# Patient Record
Sex: FEMALE | Race: WHITE | Employment: UNEMPLOYED | URBAN - METROPOLITAN AREA
[De-identification: names, ages, dates, MRNs, and addresses within clinical notes are randomized per-mention and may not be internally consistent; named-entity substitution may affect disease eponyms.]

---
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
## 2022-02-01 ENCOUNTER — NURSE TRIAGE (OUTPATIENT)
Dept: OTHER | Facility: CLINIC | Age: 22
End: 2022-02-01

## 2022-02-01 NOTE — TELEPHONE ENCOUNTER
Received call from Lucy at [facility], caller not on line.      Complaint: Pregnancy Concerns    Market: [employer] [employer]    Practice Name: [facility]    Caller's telephone number verified as [phone]    Unsuccessful attempt to re-connect with caller via phone, left message for return call to office

## 2022-02-02 ENCOUNTER — OFFICE VISIT (OUTPATIENT)
Dept: FAMILY MEDICINE CLINIC | Age: 22
End: 2022-02-02
Payer: COMMERCIAL

## 2022-02-02 VITALS
HEART RATE: 80 BPM | OXYGEN SATURATION: 99 % | HEIGHT: 63 IN | DIASTOLIC BLOOD PRESSURE: 68 MMHG | RESPIRATION RATE: 16 BRPM | WEIGHT: 139 LBS | SYSTOLIC BLOOD PRESSURE: 114 MMHG | TEMPERATURE: 97.2 F | BODY MASS INDEX: 24.63 KG/M2

## 2022-02-02 DIAGNOSIS — Z3A.01 LESS THAN 8 WEEKS GESTATION OF PREGNANCY: Primary | ICD-10-CM

## 2022-02-02 DIAGNOSIS — N92.6 MISSED MENSES: ICD-10-CM

## 2022-02-02 LAB
CONTROL: ABNORMAL
PREGNANCY TEST URINE, POC: POSITIVE

## 2022-02-02 PROCEDURE — G8420 CALC BMI NORM PARAMETERS: HCPCS | Performed by: NURSE PRACTITIONER

## 2022-02-02 PROCEDURE — 1036F TOBACCO NON-USER: CPT | Performed by: NURSE PRACTITIONER

## 2022-02-02 PROCEDURE — 81025 URINE PREGNANCY TEST: CPT | Performed by: NURSE PRACTITIONER

## 2022-02-02 PROCEDURE — 99213 OFFICE O/P EST LOW 20 MIN: CPT | Performed by: NURSE PRACTITIONER

## 2022-02-02 PROCEDURE — G8427 DOCREV CUR MEDS BY ELIG CLIN: HCPCS | Performed by: NURSE PRACTITIONER

## 2022-02-02 PROCEDURE — G8484 FLU IMMUNIZE NO ADMIN: HCPCS | Performed by: NURSE PRACTITIONER

## 2022-02-02 RX ORDER — PNV NO.95/FERROUS FUM/FOLIC AC 28MG-0.8MG
1 TABLET ORAL DAILY
Qty: 30 TABLET | Refills: 5 | Status: SHIPPED | OUTPATIENT
Start: 2022-02-02

## 2022-02-02 ASSESSMENT — ENCOUNTER SYMPTOMS
CONSTIPATION: 0
SHORTNESS OF BREATH: 0
WHEEZING: 0
VOMITING: 0
DIARRHEA: 1
NAUSEA: 0
COUGH: 0

## 2022-02-02 NOTE — PROGRESS NOTES
Liberty Thomas (:  2000) is a 23 y.o. female,Established patient, here for evaluation of the following chief complaint(s):  Amenorrhea (Missed period in January. Two at home positive pregnancy tests. Having a hard time finding an OB/GYN that is accepting new patients. ) and Discuss Medications (Stopped taking zoloft about 1 month ago. Was having bad panic attacks which resolved after d/c zoloft. )         ASSESSMENT/PLAN:  1. Less than 8 weeks gestation of pregnancy  -     Prenatal Vit-Fe Fumarate-FA (PRENATAL VITAMINS) 28-0.8 MG TABS; Take 1 tablet by mouth daily, Disp-30 tablet, R-5Normal  Will self refer to OB/GYN. Advised to call [address] [place] [address] to get a list of female providers that deliver there (patient wants to deliver there and requests female)    2. Missed menses  -     POCT urine pregnancy      Return if symptoms worsen or fail to improve. Subjective   SUBJECTIVE/OBJECTIVE:  Patient has had 2 positive pregnancy tests at home. LMP 21. EDC 10/3/22 G/1, P/0  Complains of anxiety which is stable now per patient. She stopped zoloft approx one month ago. She stopped due to frequent panic attacks      Review of Systems   Constitutional: Positive for appetite change (increased). Negative for activity change, fatigue and unexpected weight change. Respiratory: Negative for cough, shortness of breath and wheezing. Cardiovascular: Negative for chest pain and palpitations. Gastrointestinal: Positive for diarrhea. Negative for constipation, nausea and vomiting. Heartburn/reflux   Neurological: Negative for weakness, light-headedness and headaches. Objective   /68   Pulse 80   Temp 97.2 °F (36.2 °C) (Temporal)   Resp 16   Ht 5' 3\" (1.6 m)   Wt 139 lb (63 kg)   LMP 2022   SpO2 99%   BMI 24.62 kg/m²    Physical Exam  Constitutional:       General: She is not in acute distress. Appearance: Normal appearance. She is well-developed.    HENT:      Head: Normocephalic and atraumatic. Neck:      Thyroid: No thyromegaly. Trachea: No tracheal deviation. Cardiovascular:      Rate and Rhythm: Normal rate and regular rhythm. Heart sounds: No murmur heard. Pulmonary:      Effort: Pulmonary effort is normal.      Breath sounds: Normal breath sounds. No wheezing or rales. Chest:      Chest wall: No tenderness. Abdominal:      General: Bowel sounds are normal.      Palpations: Abdomen is soft. Tenderness: There is no abdominal tenderness. Lymphadenopathy:      Cervical: No cervical adenopathy. Skin:     General: Skin is warm and dry. Neurological:      Mental Status: She is alert and oriented to person, place, and time. Psychiatric:         Mood and Affect: Mood normal.         Behavior: Behavior normal.          [facility]      An electronic signature was used to authenticate this note.     --JL Azul - CNP

## 2022-02-05 ENCOUNTER — APPOINTMENT (OUTPATIENT)
Dept: ULTRASOUND IMAGING | Age: 22
End: 2022-02-05
Payer: COMMERCIAL

## 2022-02-05 ENCOUNTER — HOSPITAL ENCOUNTER (EMERGENCY)
Age: 22
Discharge: HOME | End: 2022-02-05
Attending: STUDENT IN AN ORGANIZED HEALTH CARE EDUCATION/TRAINING PROGRAM
Payer: COMMERCIAL

## 2022-02-05 VITALS
HEIGHT: 63 IN | OXYGEN SATURATION: 99 % | SYSTOLIC BLOOD PRESSURE: 100 MMHG | HEART RATE: 70 BPM | RESPIRATION RATE: 18 BRPM | TEMPERATURE: 97.6 F | WEIGHT: 130 LBS | DIASTOLIC BLOOD PRESSURE: 61 MMHG | BODY MASS INDEX: 23.04 KG/M2

## 2022-02-05 DIAGNOSIS — O20.0 THREATENED MISCARRIAGE: ICD-10-CM

## 2022-02-05 DIAGNOSIS — O46.90 VAGINAL BLEEDING IN PREGNANCY: Primary | ICD-10-CM

## 2022-02-05 LAB
ABO/RH: NORMAL
ALBUMIN SERPL-MCNC: 4.3 G/DL (ref 3.5–5.2)
ALP BLD-CCNC: 47 U/L (ref 35–104)
ALT SERPL-CCNC: 15 U/L (ref 0–32)
ANION GAP SERPL CALCULATED.3IONS-SCNC: 12 MMOL/L (ref 7–16)
AST SERPL-CCNC: 18 U/L (ref 0–31)
BACTERIA: NORMAL /HPF
BASOPHILS ABSOLUTE: 0.04 E9/L (ref 0–0.2)
BASOPHILS RELATIVE PERCENT: 0.4 % (ref 0–2)
BILIRUB SERPL-MCNC: 0.6 MG/DL (ref 0–1.2)
BILIRUBIN URINE: NEGATIVE
BLOOD, URINE: NEGATIVE
BUN BLDV-MCNC: 8 MG/DL (ref 6–20)
CALCIUM SERPL-MCNC: 9 MG/DL (ref 8.6–10.2)
CHLORIDE BLD-SCNC: 102 MMOL/L (ref 98–107)
CLARITY: CLEAR
CLUE CELLS: NORMAL
CO2: 21 MMOL/L (ref 22–29)
COLOR: NORMAL
CREAT SERPL-MCNC: 0.4 MG/DL (ref 0.5–1)
EOSINOPHILS ABSOLUTE: 0.08 E9/L (ref 0.05–0.5)
EOSINOPHILS RELATIVE PERCENT: 0.8 % (ref 0–6)
EPITHELIAL CELLS, UA: NORMAL /HPF
GFR AFRICAN AMERICAN: >60
GFR NON-AFRICAN AMERICAN: >60 ML/MIN/1.73
GLUCOSE BLD-MCNC: 89 MG/DL (ref 74–99)
GLUCOSE URINE: NEGATIVE MG/DL
GONADOTROPIN, CHORIONIC (HCG) QUANT: ABNORMAL MIU/ML
HCT VFR BLD CALC: 39.4 % (ref 34–48)
HEMOGLOBIN: 13.3 G/DL (ref 11.5–15.5)
IMMATURE GRANULOCYTES #: 0.06 E9/L
IMMATURE GRANULOCYTES %: 0.6 % (ref 0–5)
KETONES, URINE: NEGATIVE MG/DL
LEUKOCYTE ESTERASE, URINE: NEGATIVE
LYMPHOCYTES ABSOLUTE: 1.68 E9/L (ref 1.5–4)
LYMPHOCYTES RELATIVE PERCENT: 16.9 % (ref 20–42)
MAGNESIUM: 2 MG/DL (ref 1.6–2.6)
MCH RBC QN AUTO: 32.4 PG (ref 26–35)
MCHC RBC AUTO-ENTMCNC: 33.8 % (ref 32–34.5)
MCV RBC AUTO: 96.1 FL (ref 80–99.9)
MONOCYTES ABSOLUTE: 0.52 E9/L (ref 0.1–0.95)
MONOCYTES RELATIVE PERCENT: 5.2 % (ref 2–12)
NEUTROPHILS ABSOLUTE: 7.56 E9/L (ref 1.8–7.3)
NEUTROPHILS RELATIVE PERCENT: 76.1 % (ref 43–80)
NITRITE, URINE: NEGATIVE
PDW BLD-RTO: 12.1 FL (ref 11.5–15)
PH UA: 7.5 (ref 5–9)
PLATELET # BLD: 249 E9/L (ref 130–450)
PMV BLD AUTO: 9.8 FL (ref 7–12)
POTASSIUM REFLEX MAGNESIUM: 3.5 MMOL/L (ref 3.5–5)
PROTEIN UA: NEGATIVE MG/DL
RBC # BLD: 4.1 E12/L (ref 3.5–5.5)
RBC UA: NORMAL /HPF (ref 0–2)
SODIUM BLD-SCNC: 135 MMOL/L (ref 132–146)
SOURCE WET PREP: NORMAL
SPECIFIC GRAVITY UA: 1.01 (ref 1–1.03)
TOTAL PROTEIN: 6.7 G/DL (ref 6.4–8.3)
TRICHOMONAS PREP: NORMAL
UROBILINOGEN, URINE: 0.2 E.U./DL
WBC # BLD: 9.9 E9/L (ref 4.5–11.5)
WBC UA: NORMAL /HPF (ref 0–5)
YEAST WET PREP: NORMAL

## 2022-02-05 PROCEDURE — 76801 OB US < 14 WKS SINGLE FETUS: CPT

## 2022-02-05 PROCEDURE — 84702 CHORIONIC GONADOTROPIN TEST: CPT

## 2022-02-05 PROCEDURE — 87591 N.GONORRHOEAE DNA AMP PROB: CPT

## 2022-02-05 PROCEDURE — 83735 ASSAY OF MAGNESIUM: CPT

## 2022-02-05 PROCEDURE — 86901 BLOOD TYPING SEROLOGIC RH(D): CPT

## 2022-02-05 PROCEDURE — 99283 EMERGENCY DEPT VISIT LOW MDM: CPT

## 2022-02-05 PROCEDURE — 87491 CHLMYD TRACH DNA AMP PROBE: CPT

## 2022-02-05 PROCEDURE — 86900 BLOOD TYPING SEROLOGIC ABO: CPT

## 2022-02-05 PROCEDURE — 85025 COMPLETE CBC W/AUTO DIFF WBC: CPT

## 2022-02-05 PROCEDURE — 80053 COMPREHEN METABOLIC PANEL: CPT

## 2022-02-05 PROCEDURE — 87210 SMEAR WET MOUNT SALINE/INK: CPT

## 2022-02-05 PROCEDURE — 81001 URINALYSIS AUTO W/SCOPE: CPT

## 2022-02-05 ASSESSMENT — PAIN SCALES - GENERAL: PAINLEVEL_OUTOF10: 3

## 2022-02-05 ASSESSMENT — PAIN DESCRIPTION - ORIENTATION: ORIENTATION: LOWER;MID

## 2022-02-05 ASSESSMENT — PAIN DESCRIPTION - LOCATION: LOCATION: ABDOMEN

## 2022-02-05 ASSESSMENT — PAIN DESCRIPTION - DESCRIPTORS: DESCRIPTORS: CRAMPING

## 2022-02-05 NOTE — ED PROVIDER NOTES
[address] [facility]  Department of Emergency Medicine     Written by: Sarabjit Borrero DO  Patient Name: Kiesha Salvador  Attending Provider: Rusty Torres MD  Admit Date: 2022  9:15 AM  MRN: [id number]                   : 2000        Chief Complaint   Patient presents with    Vaginal Bleeding     spotting low abd pain at 3/10 onset 1 hour    - Chief complaint    Ms. Juve Rose is a 22 yo female without significant PMHx who presents to the ED due to vaginal bleeding. Patient is 5 weeks pregnant and has not yet seen an OB/GYN for a formal ultrasound but notes that she began having vaginal bleeding today. She does not currently have an OB/GYN that she is following with. She states it is a spotting with some mild lower abdominal cramping. She has not had this to this point in her pregnancy. She denies any vaginal discharge at this time. She denies any urinary symptoms such as dysuria, frequency or hematuria. This is her first pregnancy. As her symptoms have been constant since onset. Denies any aggravating or relieving factors. States she has not had any recent fevers, chills, nausea, vomiting, chest pain, shortness of breath, abdominal pain, bowel or urinary changes, headaches or vision changes. Review of Systems   Constitutional: Negative for chills, fatigue and fever. HENT: Negative for congestion, sinus pressure and sinus pain. Eyes: Negative for pain and redness. Respiratory: Negative for cough, chest tightness and shortness of breath. Cardiovascular: Negative for chest pain, palpitations and leg swelling. Gastrointestinal: Positive for abdominal pain (Lower). Negative for constipation, diarrhea, nausea and vomiting. Genitourinary: Positive for vaginal bleeding. Negative for decreased urine volume, dysuria, flank pain, frequency, hematuria, urgency, vaginal discharge and vaginal pain. Musculoskeletal: Negative for joint swelling and myalgias.    Skin: Negative for rash.   Neurological: Negative for dizziness, light-headedness and headaches. Physical Exam  Exam conducted with a chaperone present. Constitutional:       General: She is not in acute distress. Appearance: Normal appearance. She is normal weight. HENT:      Head: Normocephalic and atraumatic. Right Ear: External ear normal.      Left Ear: External ear normal.      Mouth/Throat:      Mouth: Mucous membranes are moist.      Pharynx: Oropharynx is clear. Eyes:      Extraocular Movements: Extraocular movements intact. Conjunctiva/sclera: Conjunctivae normal.   Cardiovascular:      Rate and Rhythm: Normal rate and regular rhythm. Pulses: Normal pulses. Heart sounds: Normal heart sounds. Pulmonary:      Effort: Pulmonary effort is normal. No respiratory distress. Breath sounds: Normal breath sounds. No wheezing. Abdominal:      General: Abdomen is flat. Bowel sounds are normal.      Palpations: Abdomen is soft. Tenderness: There is abdominal tenderness (Lower abdominal - worst suprapubic). There is no guarding or rebound. Genitourinary:     Vagina: Normal. No vaginal discharge or bleeding. Cervix: Normal.   Musculoskeletal:         General: No tenderness. Normal range of motion. Cervical back: Normal range of motion and neck supple. No rigidity or tenderness. Skin:     General: Skin is warm and dry. Neurological:      General: No focal deficit present. Mental Status: She is alert and oriented to person, place, and time. Mental status is at baseline. Sensory: No sensory deficit. Motor: No weakness. Psychiatric:         Mood and Affect: Mood normal.         Behavior: Behavior normal.          Procedures       MDM  Number of Diagnoses or Management Options  Threatened miscarriage  Vaginal bleeding in pregnancy  Diagnosis management comments: This is a 24 yo female without significant PMHx who presents to the ED due to vaginal bleeding. Patient CBC was benign within normal limits. Her CMP was also within normal limits. Magnesium was normal.  Quantitative hCG was 12,860. Urinalysis was negative for any sort of urinary tract infection. ABO Rh was A+. Wet prep was negative. Chlamydia gonorrhea still pending. OB ultrasound revealed gestational sac corresponding to approximately 5.2 weeks gestational age without a fetal pole detected there is no evidence of free fluid or adnexal masses to suggest ectopic pregnancy. She has been given referral to an OB/GYN to establish care and follow-up with a repeat ultrasound in 1 to 2 weeks to confirm pregnancy. Patient has been told that spotting in early pregnancy is common and to monitor her symptoms if it worsens. Patient been told return the emergency department if her symptoms return, worsen or change at any time. --------------------------------------------- PAST HISTORY ---------------------------------------------  Past Medical History:  has a past medical history of Anxiety and depression. Past Surgical History:  has a past surgical history that includes Tonsillectomy; Adenoidectomy; and Tympanostomy tube placement. Social History:  reports that she quit smoking 8 days ago. She has never used smokeless tobacco. She reports that she does not drink alcohol and does not use drugs. Family History: Family history is unknown by patient. The patients home medications have been reviewed.     Allergies: Cefzil [cefprozil]    -------------------------------------------------- RESULTS -------------------------------------------------  Labs:  Results for orders placed or performed during the hospital encounter of 02/05/22   Wet prep, genital    Specimen: Vaginal   Result Value Ref Range    Trichomonas Prep None Seen     Yeast, Wet Prep None Seen     Clue Cells, Wet Prep None Seen     Source Wet Prep VAGINAL    C.trachomatis N.gonorrhoeae DNA, Urine    Specimen: Urine   Result Value Ref Range    Source Urine    Urinalysis with Microscopic   Result Value Ref Range    Color, UA Straw Straw/Yellow    Clarity, UA Clear Clear    Glucose, Ur Negative Negative mg/dL    Bilirubin Urine Negative Negative    Ketones, Urine Negative Negative mg/dL    Specific Gravity, UA 1.010 1.005 - 1.030    Blood, Urine Negative Negative    pH, UA 7.5 5.0 - 9.0    Protein, UA Negative Negative mg/dL    Urobilinogen, Urine 0.2 <2.0 E.U./dL    Nitrite, Urine Negative Negative    Leukocyte Esterase, Urine Negative Negative    WBC, UA 0-1 0 - 5 /HPF    RBC, UA NONE 0 - 2 /HPF    Epithelial Cells, UA RARE /HPF    Bacteria, UA NONE SEEN None Seen /HPF   CBC auto differential   Result Value Ref Range    WBC 9.9 4.5 - 11.5 E9/L    RBC 4.10 3.50 - 5.50 E12/L    Hemoglobin 13.3 11.5 - 15.5 g/dL    Hematocrit 39.4 34.0 - 48.0 %    MCV 96.1 80.0 - 99.9 fL    MCH 32.4 26.0 - 35.0 pg    MCHC 33.8 32.0 - 34.5 %    RDW 12.1 11.5 - 15.0 fL    Platelets 658 811 - 150 E9/L    MPV 9.8 7.0 - 12.0 fL    Neutrophils % 76.1 43.0 - 80.0 %    Immature Granulocytes % 0.6 0.0 - 5.0 %    Lymphocytes % 16.9 (L) 20.0 - 42.0 %    Monocytes % 5.2 2.0 - 12.0 %    Eosinophils % 0.8 0.0 - 6.0 %    Basophils % 0.4 0.0 - 2.0 %    Neutrophils Absolute 7.56 (H) 1.80 - 7.30 E9/L    Immature Granulocytes # 0.06 E9/L    Lymphocytes Absolute 1.68 1.50 - 4.00 E9/L    Monocytes Absolute 0.52 0.10 - 0.95 E9/L    Eosinophils Absolute 0.08 0.05 - 0.50 E9/L    Basophils Absolute 0.04 0.00 - 0.20 E9/L   Comprehensive Metabolic Panel w/ Reflex to MG   Result Value Ref Range    Sodium 135 132 - 146 mmol/L    Potassium reflex Magnesium 3.5 3.5 - 5.0 mmol/L    Chloride 102 98 - 107 mmol/L    CO2 21 (L) 22 - 29 mmol/L    Anion Gap 12 7 - 16 mmol/L    Glucose 89 74 - 99 mg/dL    BUN 8 6 - 20 mg/dL    CREATININE 0.4 (L) 0.5 - 1.0 mg/dL    GFR Non-African American >60 >=60 mL/min/1.73    GFR African American >60     Calcium 9.0 8.6 - 10.2 mg/dL    Total Protein 6.7 6.4 - 8.3 g/dL    Albumin 4.3 3.5 - 5.2 g/dL    Total Bilirubin 0.6 0.0 - 1.2 mg/dL    Alkaline Phosphatase 47 35 - 104 U/L    ALT 15 0 - 32 U/L    AST 18 0 - 31 U/L   HCG, Quantitative, Pregnancy   Result Value Ref Range    hCG Quant 59058.0 (H) <10 mIU/mL   Magnesium   Result Value Ref Range    Magnesium 2.0 1.6 - 2.6 mg/dL   ABO/RH   Result Value Ref Range    ABO/Rh A POS        Radiology:  US OB LESS THAN 14 WEEKS SINGLE OR FIRST GESTATION   Final Result   Gestational sac corresponding to approximately 5.2 weeks gestational age. No   fetal pole detected. No evidence of free fluid or adnexal mass to suggest   ectopic pregnancy. Consider evaluation of serial beta-hCGs and/or follow-up pelvic ultrasound in   1-2 weeks to confirm pregnancy.             ------------------------- NURSING NOTES AND VITALS REVIEWED ---------------------------  Date / Time Roomed:  2/5/2022  9:15 AM  ED Bed Assignment:  16/16    The nursing notes within the ED encounter and vital signs as below have been reviewed. /61   Pulse 70   Temp 97.6 °F (36.4 °C) (Infrared)   Resp 18   Ht 5' 3\" (1.6 m)   Wt 130 lb (59 kg)   LMP 12/27/2021   SpO2 99%   BMI 23.03 kg/m²   Oxygen Saturation Interpretation: Normal      ------------------------------------------ PROGRESS NOTES ------------------------------------------  10:10 AM EST  I have spoken with the patient and discussed todays results, in addition to providing specific details for the plan of care and counseling regarding the diagnosis and prognosis. Their questions are answered at this time and they are agreeable with the plan. I discussed at length with them reasons for immediate return here for re evaluation. They will followup with their OB/GYN and primary care physician by calling their office tomorrow.       --------------------------------- ADDITIONAL PROVIDER NOTES ---------------------------------  At this time the patient is without objective evidence of an acute process requiring hospitalization or inpatient management. They have remained hemodynamically stable throughout their entire ED visit and are stable for discharge with outpatient follow-up. The plan has been discussed in detail and they are aware of the specific conditions for emergent return, as well as the importance of follow-up. Discharge Medication List as of 2/5/2022 12:23 PM          Diagnosis:  1. Vaginal bleeding in pregnancy    2. Threatened miscarriage        Disposition:  Patient's disposition: Discharge to home  Patient's condition is stable. Patient was seen and evaluated by myself and my attending Ann Louis MD. Assessment and Plan discussed with attending provider, please see attestation for final plan of care.      DO Juana Garcia DO  Resident  02/06/22 [id number]

## 2022-02-05 NOTE — ED NOTES
Pt present to ED with vaginal bleeding that started today, pt states that she is 5 weeks pregnant and she sees blood when she wipes. Pt states she has some abdominal cramping as well.      Marylee Kansky, RN  02/05/22 [id number]

## 2022-02-06 ASSESSMENT — ENCOUNTER SYMPTOMS
DIARRHEA: 0
ABDOMINAL PAIN: 1
EYE PAIN: 0
SINUS PRESSURE: 0
VOMITING: 0
EYE REDNESS: 0
CHEST TIGHTNESS: 0
COUGH: 0
NAUSEA: 0
SHORTNESS OF BREATH: 0
CONSTIPATION: 0
SINUS PAIN: 0

## 2022-02-08 ASSESSMENT — PATIENT HEALTH QUESTIONNAIRE - PHQ9
6. FEELING BAD ABOUT YOURSELF - OR THAT YOU ARE A FAILURE OR HAVE LET YOURSELF OR YOUR FAMILY DOWN: 0
2. FEELING DOWN, DEPRESSED OR HOPELESS: 1
10. IF YOU CHECKED OFF ANY PROBLEMS, HOW DIFFICULT HAVE THESE PROBLEMS MADE IT FOR YOU TO DO YOUR WORK, TAKE CARE OF THINGS AT HOME, OR GET ALONG WITH OTHER PEOPLE: 0
SUM OF ALL RESPONSES TO PHQ QUESTIONS 1-9: 3
SUM OF ALL RESPONSES TO PHQ QUESTIONS 1-9: 3
7. TROUBLE CONCENTRATING ON THINGS, SUCH AS READING THE NEWSPAPER OR WATCHING TELEVISION: 0
SUM OF ALL RESPONSES TO PHQ QUESTIONS 1-9: 3
5. POOR APPETITE OR OVEREATING: 2
3. TROUBLE FALLING OR STAYING ASLEEP: 0
SUM OF ALL RESPONSES TO PHQ QUESTIONS 1-9: 3
SUM OF ALL RESPONSES TO PHQ9 QUESTIONS 1 & 2: 1
1. LITTLE INTEREST OR PLEASURE IN DOING THINGS: 0
8. MOVING OR SPEAKING SO SLOWLY THAT OTHER PEOPLE COULD HAVE NOTICED. OR THE OPPOSITE, BEING SO FIGETY OR RESTLESS THAT YOU HAVE BEEN MOVING AROUND A LOT MORE THAN USUAL: 0
9. THOUGHTS THAT YOU WOULD BE BETTER OFF DEAD, OR OF HURTING YOURSELF: 0
4. FEELING TIRED OR HAVING LITTLE ENERGY: 0

## 2022-02-09 LAB
C. TRACHOMATIS DNA ,URINE: NEGATIVE
N. GONORRHOEAE DNA, URINE: NEGATIVE
SOURCE: NORMAL

## 2022-02-14 ENCOUNTER — TELEPHONE (OUTPATIENT)
Dept: ADMINISTRATIVE | Age: 22
End: 2022-02-14

## 2022-02-14 NOTE — TELEPHONE ENCOUNTER
Pt called and requested to schedule as a new OB. Her LMP was 12/27 and first positive pregnancy test was 1/29. No availability. Please contact pt.

## 2022-05-04 ENCOUNTER — APPOINTMENT (OUTPATIENT)
Dept: ULTRASOUND IMAGING | Age: 22
End: 2022-05-04
Payer: COMMERCIAL

## 2022-05-04 ENCOUNTER — HOSPITAL ENCOUNTER (EMERGENCY)
Age: 22
Discharge: HOME | End: 2022-05-04
Attending: EMERGENCY MEDICINE
Payer: COMMERCIAL

## 2022-05-04 VITALS
SYSTOLIC BLOOD PRESSURE: 91 MMHG | TEMPERATURE: 97.5 F | HEIGHT: 63 IN | WEIGHT: 140 LBS | BODY MASS INDEX: 24.8 KG/M2 | OXYGEN SATURATION: 100 % | RESPIRATION RATE: 16 BRPM | DIASTOLIC BLOOD PRESSURE: 52 MMHG | HEART RATE: 59 BPM

## 2022-05-04 DIAGNOSIS — O26.892 PELVIC PAIN AFFECTING PREGNANCY IN SECOND TRIMESTER, ANTEPARTUM: Primary | ICD-10-CM

## 2022-05-04 DIAGNOSIS — R10.2 PELVIC PAIN AFFECTING PREGNANCY IN SECOND TRIMESTER, ANTEPARTUM: Primary | ICD-10-CM

## 2022-05-04 LAB
ALBUMIN SERPL-MCNC: 4 G/DL (ref 3.5–5.2)
ALP BLD-CCNC: 43 U/L (ref 35–104)
ALT SERPL-CCNC: 13 U/L (ref 0–32)
ANION GAP SERPL CALCULATED.3IONS-SCNC: 14 MMOL/L (ref 7–16)
AST SERPL-CCNC: 23 U/L (ref 0–31)
BACTERIA: ABNORMAL /HPF
BASOPHILS ABSOLUTE: 0.03 E9/L (ref 0–0.2)
BASOPHILS RELATIVE PERCENT: 0.3 % (ref 0–2)
BILIRUB SERPL-MCNC: 0.3 MG/DL (ref 0–1.2)
BILIRUBIN URINE: NEGATIVE
BLOOD, URINE: NEGATIVE
BUN BLDV-MCNC: 3 MG/DL (ref 6–20)
CALCIUM SERPL-MCNC: 9.2 MG/DL (ref 8.6–10.2)
CHLORIDE BLD-SCNC: 106 MMOL/L (ref 98–107)
CLARITY: CLEAR
CO2: 21 MMOL/L (ref 22–29)
COLOR: YELLOW
CREAT SERPL-MCNC: 0.3 MG/DL (ref 0.5–1)
EOSINOPHILS ABSOLUTE: 0.08 E9/L (ref 0.05–0.5)
EOSINOPHILS RELATIVE PERCENT: 0.7 % (ref 0–6)
EPITHELIAL CELLS, UA: ABNORMAL /HPF
GFR AFRICAN AMERICAN: >60
GFR NON-AFRICAN AMERICAN: >60 ML/MIN/1.73
GLUCOSE BLD-MCNC: 79 MG/DL (ref 74–99)
GLUCOSE URINE: NEGATIVE MG/DL
GONADOTROPIN, CHORIONIC (HCG) QUANT: ABNORMAL MIU/ML
HCT VFR BLD CALC: 34 % (ref 34–48)
HEMOGLOBIN: 11.7 G/DL (ref 11.5–15.5)
IMMATURE GRANULOCYTES #: 0.06 E9/L
IMMATURE GRANULOCYTES %: 0.6 % (ref 0–5)
KETONES, URINE: 15 MG/DL
LEUKOCYTE ESTERASE, URINE: ABNORMAL
LIPASE: 28 U/L (ref 13–60)
LYMPHOCYTES ABSOLUTE: 2.06 E9/L (ref 1.5–4)
LYMPHOCYTES RELATIVE PERCENT: 18.9 % (ref 20–42)
MCH RBC QN AUTO: 32.5 PG (ref 26–35)
MCHC RBC AUTO-ENTMCNC: 34.4 % (ref 32–34.5)
MCV RBC AUTO: 94.4 FL (ref 80–99.9)
MONOCYTES ABSOLUTE: 0.45 E9/L (ref 0.1–0.95)
MONOCYTES RELATIVE PERCENT: 4.1 % (ref 2–12)
NEUTROPHILS ABSOLUTE: 8.21 E9/L (ref 1.8–7.3)
NEUTROPHILS RELATIVE PERCENT: 75.4 % (ref 43–80)
NITRITE, URINE: NEGATIVE
PDW BLD-RTO: 13.2 FL (ref 11.5–15)
PH UA: 6.5 (ref 5–9)
PLATELET # BLD: 205 E9/L (ref 130–450)
PMV BLD AUTO: 10.4 FL (ref 7–12)
POTASSIUM SERPL-SCNC: 3.7 MMOL/L (ref 3.5–5)
PROTEIN UA: NEGATIVE MG/DL
RBC # BLD: 3.6 E12/L (ref 3.5–5.5)
RBC UA: ABNORMAL /HPF (ref 0–2)
SODIUM BLD-SCNC: 141 MMOL/L (ref 132–146)
SPECIFIC GRAVITY UA: 1.01 (ref 1–1.03)
TOTAL PROTEIN: 6.8 G/DL (ref 6.4–8.3)
UROBILINOGEN, URINE: 0.2 E.U./DL
WBC # BLD: 10.9 E9/L (ref 4.5–11.5)
WBC UA: ABNORMAL /HPF (ref 0–5)

## 2022-05-04 PROCEDURE — 80053 COMPREHEN METABOLIC PANEL: CPT

## 2022-05-04 PROCEDURE — 84702 CHORIONIC GONADOTROPIN TEST: CPT

## 2022-05-04 PROCEDURE — 85025 COMPLETE CBC W/AUTO DIFF WBC: CPT

## 2022-05-04 PROCEDURE — 2580000003 HC RX 258: Performed by: EMERGENCY MEDICINE

## 2022-05-04 PROCEDURE — 99284 EMERGENCY DEPT VISIT MOD MDM: CPT

## 2022-05-04 PROCEDURE — 76805 OB US >/= 14 WKS SNGL FETUS: CPT

## 2022-05-04 PROCEDURE — 6370000000 HC RX 637 (ALT 250 FOR IP): Performed by: EMERGENCY MEDICINE

## 2022-05-04 PROCEDURE — 81001 URINALYSIS AUTO W/SCOPE: CPT

## 2022-05-04 PROCEDURE — 83690 ASSAY OF LIPASE: CPT

## 2022-05-04 RX ORDER — ACETAMINOPHEN 325 MG/1
650 TABLET ORAL ONCE
Status: COMPLETED | OUTPATIENT
Start: 2022-05-04 | End: 2022-05-04

## 2022-05-04 RX ORDER — 0.9 % SODIUM CHLORIDE 0.9 %
1000 INTRAVENOUS SOLUTION INTRAVENOUS ONCE
Status: COMPLETED | OUTPATIENT
Start: 2022-05-04 | End: 2022-05-04

## 2022-05-04 RX ADMIN — SODIUM CHLORIDE 1000 ML: 9 INJECTION, SOLUTION INTRAVENOUS at 07:31

## 2022-05-04 RX ADMIN — ACETAMINOPHEN 650 MG: 325 TABLET ORAL at 07:45

## 2022-05-04 ASSESSMENT — PAIN SCALES - GENERAL
PAINLEVEL_OUTOF10: 2
PAINLEVEL_OUTOF10: 10
PAINLEVEL_OUTOF10: 5
PAINLEVEL_OUTOF10: 5

## 2022-05-04 ASSESSMENT — ENCOUNTER SYMPTOMS
NAUSEA: 0
EYE DISCHARGE: 0
SHORTNESS OF BREATH: 0
EYE PAIN: 0
BACK PAIN: 0
ABDOMINAL DISTENTION: 0
DIARRHEA: 0
SORE THROAT: 0
ABDOMINAL PAIN: 1
COUGH: 0
SINUS PRESSURE: 0
EYE REDNESS: 0
VOMITING: 0
WHEEZING: 0

## 2022-05-04 ASSESSMENT — PAIN DESCRIPTION - LOCATION: LOCATION: ABDOMEN

## 2022-05-04 ASSESSMENT — PAIN - FUNCTIONAL ASSESSMENT
PAIN_FUNCTIONAL_ASSESSMENT: 0-10
PAIN_FUNCTIONAL_ASSESSMENT: 0-10

## 2022-05-04 NOTE — ED PROVIDER NOTES
Patient is a 22 y/o female who presents to the ED with abdominal pain. Patient states that she had onset of lower abdominal pain tonight. The pain has been constant and is 10/10. She is currently approximately 18 weeks pregnant. She denies any fever, nausea, vomiting, diarrhea, dysuria, gross hematuria, vaginal discharge or vaginal bleeding. This is her first pregnancy. Review of Systems   Constitutional: Negative for chills and fever. HENT: Negative for ear pain, sinus pressure and sore throat. Eyes: Negative for pain, discharge and redness. Respiratory: Negative for cough, shortness of breath and wheezing. Cardiovascular: Negative for chest pain. Gastrointestinal: Positive for abdominal pain. Negative for abdominal distention, diarrhea, nausea and vomiting. Genitourinary: Negative for dysuria and frequency. Musculoskeletal: Negative for arthralgias and back pain. Skin: Negative for rash and wound. Neurological: Negative for weakness and headaches. Hematological: Negative for adenopathy. All other systems reviewed and are negative. Physical Exam  Vitals and nursing note reviewed. Constitutional:       General: She is not in acute distress. HENT:      Head: Normocephalic and atraumatic. Mouth/Throat:      Mouth: Mucous membranes are moist.   Eyes:      Pupils: Pupils are equal, round, and reactive to light. Cardiovascular:      Rate and Rhythm: Normal rate and regular rhythm. Heart sounds: No murmur heard. Pulmonary:      Effort: Pulmonary effort is normal. No respiratory distress. Breath sounds: Normal breath sounds. No stridor. No wheezing, rhonchi or rales. Abdominal:      General: Abdomen is flat. Bowel sounds are normal.      Palpations: Abdomen is soft. Tenderness: There is abdominal tenderness in the epigastric area and suprapubic area. There is no guarding. Negative signs include Reyes's sign and McBurney's sign.    Skin:     General: Skin is warm and dry. Findings: No rash. Neurological:      Mental Status: She is alert and oriented to person, place, and time. Procedures     [facility]     ED Course as of 05/04/22 0927   Wed May 04, 2022   0948 7:23 AM EDT  I received this patient at sign out from Dr. Duarte Le. I have discussed the patient's initial exam, treatment and plan of care with the out going physician. I have introduced my self to the patient / family and have answered their questions to this point. I have examined the patient myself and reviewed ordered tests / medications and  reviewed any available results to this point. If a resident is involved in the Emergency Department care, I have discussed my findings and plan with them as well. Patient states she is feeling improved but pain is not gone. Her blood pressure is improved. [JS]      ED Course User Index  [JS] Shy May DO        7:20 AM EDT  I have signed this patient out to the oncoming physician, Dr. Bridget Garcia. I have discussed the patient's initial exam, treatment and plan of care with the on coming physician. I have notified the patient / family of the change in treating physician and answered their questions to this point. ED Course as of 05/04/22 0928   Wed May 04, 2022   6850 7:23 AM EDT  I received this patient at sign out from Dr. Duarte Le. I have discussed the patient's initial exam, treatment and plan of care with the out going physician. I have introduced my self to the patient / family and have answered their questions to this point. I have examined the patient myself and reviewed ordered tests / medications and  reviewed any available results to this point. If a resident is involved in the Emergency Department care, I have discussed my findings and plan with them as well. Patient states she is feeling improved but pain is not gone. Her blood pressure is improved.    [JS]      ED Course User Index  [JS] Shy May DO --------------------------------------------- PAST HISTORY ---------------------------------------------  Past Medical History:  has a past medical history of Anxiety and depression. Past Surgical History:  has a past surgical history that includes Tonsillectomy; Adenoidectomy; and Tympanostomy tube placement. Social History:  reports that she quit smoking about 3 months ago. She has never used smokeless tobacco. She reports that she does not drink alcohol and does not use drugs. Family History: Family history is unknown by patient. The patients home medications have been reviewed.     Allergies: Cefzil [cefprozil]    -------------------------------------------------- RESULTS -------------------------------------------------  Labs:  Results for orders placed or performed during the hospital encounter of 05/04/22   Urinalysis   Result Value Ref Range    Color, UA Yellow Straw/Yellow    Clarity, UA Clear Clear    Glucose, Ur Negative Negative mg/dL    Bilirubin Urine Negative Negative    Ketones, Urine 15 (A) Negative mg/dL    Specific Gravity, UA 1.015 1.005 - 1.030    Blood, Urine Negative Negative    pH, UA 6.5 5.0 - 9.0    Protein, UA Negative Negative mg/dL    Urobilinogen, Urine 0.2 <2.0 E.U./dL    Nitrite, Urine Negative Negative    Leukocyte Esterase, Urine SMALL (A) Negative   CBC with Auto Differential   Result Value Ref Range    WBC 10.9 4.5 - 11.5 E9/L    RBC 3.60 3.50 - 5.50 E12/L    Hemoglobin 11.7 11.5 - 15.5 g/dL    Hematocrit 34.0 34.0 - 48.0 %    MCV 94.4 80.0 - 99.9 fL    MCH 32.5 26.0 - 35.0 pg    MCHC 34.4 32.0 - 34.5 %    RDW 13.2 11.5 - 15.0 fL    Platelets 686 418 - 216 E9/L    MPV 10.4 7.0 - 12.0 fL    Neutrophils % 75.4 43.0 - 80.0 %    Immature Granulocytes % 0.6 0.0 - 5.0 %    Lymphocytes % 18.9 (L) 20.0 - 42.0 %    Monocytes % 4.1 2.0 - 12.0 %    Eosinophils % 0.7 0.0 - 6.0 %    Basophils % 0.3 0.0 - 2.0 %    Neutrophils Absolute 8.21 (H) 1.80 - 7.30 E9/L    Immature Granulocytes # 0.06 E9/L    Lymphocytes Absolute 2.06 1.50 - 4.00 E9/L    Monocytes Absolute 0.45 0.10 - 0.95 E9/L    Eosinophils Absolute 0.08 0.05 - 0.50 E9/L    Basophils Absolute 0.03 0.00 - 0.20 E9/L   Comprehensive Metabolic Panel   Result Value Ref Range    Sodium 141 132 - 146 mmol/L    Potassium 3.7 3.5 - 5.0 mmol/L    Chloride 106 98 - 107 mmol/L    CO2 21 (L) 22 - 29 mmol/L    Anion Gap 14 7 - 16 mmol/L    Glucose 79 74 - 99 mg/dL    BUN 3 (L) 6 - 20 mg/dL    CREATININE 0.3 (L) 0.5 - 1.0 mg/dL    GFR Non-African American >60 >=60 mL/min/1.73    GFR African American >60     Calcium 9.2 8.6 - 10.2 mg/dL    Total Protein 6.8 6.4 - 8.3 g/dL    Albumin 4.0 3.5 - 5.2 g/dL    Total Bilirubin 0.3 0.0 - 1.2 mg/dL    Alkaline Phosphatase 43 35 - 104 U/L    ALT 13 0 - 32 U/L    AST 23 0 - 31 U/L   Lipase   Result Value Ref Range    Lipase 28 13 - 60 U/L   hCG, quantitative, pregnancy   Result Value Ref Range    hCG Quant 73124.0 (H) <10 mIU/mL   Microscopic Urinalysis   Result Value Ref Range    WBC, UA 10-20 (A) 0 - 5 /HPF    RBC, UA 1-3 0 - 2 /HPF    Epithelial Cells, UA MODERATE /HPF    Bacteria, UA FEW (A) None Seen /HPF       Radiology:  US OB 14 PLUS WEEKS SINGLE OR FIRST GESTATION   Final Result   A single live intrauterine pregnancy with estimated gestational age of 18   weeks 3 days +/-9 days by ultrasound. The estimated fetal weight is 235.6   +/- 35.3 g.             ------------------------- NURSING NOTES AND VITALS REVIEWED ---------------------------  Date / Time Roomed:  5/4/2022  5:15 AM  ED Bed Assignment:  08/08    The nursing notes within the ED encounter and vital signs as below have been reviewed.    BP (!) 91/52   Pulse 59   Temp 97.5 °F (36.4 °C) (Tympanic)   Resp 16   Ht 5' 3\" (1.6 m)   Wt 140 lb (63.5 kg)   LMP 01/27/2022   SpO2 100%   BMI 24.80 kg/m²   Oxygen Saturation Interpretation: Normal      ------------------------------------------ PROGRESS NOTES ------------------------------------------  I have spoken with the patient and discussed todays results, in addition to providing specific details for the plan of care and counseling regarding the diagnosis and prognosis. Their questions are answered at this time and they are agreeable with the plan. I discussed at length with them reasons for immediate return here for re evaluation. They will followup with primary care by calling their office tomorrow. --------------------------------- ADDITIONAL PROVIDER NOTES ---------------------------------  At this time the patient is without objective evidence of an acute process requiring hospitalization or inpatient management. They have remained hemodynamically stable throughout their entire ED visit and are stable for discharge with outpatient follow-up. The plan has been discussed in detail and they are aware of the specific conditions for emergent return, as well as the importance of follow-up. New Prescriptions    No medications on file       Diagnosis:  1. Pelvic pain affecting pregnancy in second trimester, antepartum        Disposition:  Patient's disposition: Discharge to home  Patient's condition is stable.          Nancy Hernandez DO  05/04/22 0288

## 2022-05-04 NOTE — ED NOTES
Water picture given to the pt and asked to notify me when she feels like she needs to urinate      Nabeel Falcon RN  05/04/22 0800

## 2022-07-09 ENCOUNTER — HOSPITAL ENCOUNTER (OUTPATIENT)
Age: 22
Discharge: HOME | End: 2022-07-09
Payer: COMMERCIAL

## 2022-07-09 LAB
GLUCOSE TOLERANCE SCREEN 50G: 100 MG/DL (ref 70–140)
HCT VFR BLD CALC: 32.8 % (ref 34–48)
HEMOGLOBIN: 11.3 G/DL (ref 11.5–15.5)
MCH RBC QN AUTO: 33.2 PG (ref 26–35)
MCHC RBC AUTO-ENTMCNC: 34.5 % (ref 32–34.5)
MCV RBC AUTO: 96.5 FL (ref 80–99.9)
PDW BLD-RTO: 13.3 FL (ref 11.5–15)
PLATELET # BLD: 177 E9/L (ref 130–450)
PMV BLD AUTO: 9.6 FL (ref 7–12)
RBC # BLD: 3.4 E12/L (ref 3.5–5.5)
WBC # BLD: 9.9 E9/L (ref 4.5–11.5)

## 2022-07-09 PROCEDURE — 85027 COMPLETE CBC AUTOMATED: CPT

## 2022-07-09 PROCEDURE — 36415 COLL VENOUS BLD VENIPUNCTURE: CPT

## 2022-07-09 PROCEDURE — 82950 GLUCOSE TEST: CPT

## 2022-07-14 ENCOUNTER — HOSPITAL ENCOUNTER (OUTPATIENT)
Age: 22
Discharge: HOME | End: 2022-07-14
Attending: OBSTETRICS & GYNECOLOGY | Admitting: OBSTETRICS & GYNECOLOGY
Payer: COMMERCIAL

## 2022-07-14 VITALS
RESPIRATION RATE: 16 BRPM | HEIGHT: 63 IN | OXYGEN SATURATION: 99 % | DIASTOLIC BLOOD PRESSURE: 55 MMHG | HEART RATE: 69 BPM | TEMPERATURE: 98.2 F | BODY MASS INDEX: 21.26 KG/M2 | SYSTOLIC BLOOD PRESSURE: 102 MMHG | WEIGHT: 120 LBS

## 2022-07-14 PROBLEM — Z3A.28 28 WEEKS GESTATION OF PREGNANCY: Status: ACTIVE | Noted: 2022-07-14

## 2022-07-14 PROBLEM — N93.0 PCB (POST COITAL BLEEDING): Status: ACTIVE | Noted: 2022-07-14

## 2022-07-14 LAB
BACTERIA: ABNORMAL /HPF
BILIRUBIN URINE: NEGATIVE
BLOOD, URINE: ABNORMAL
CLARITY: ABNORMAL
COLOR: YELLOW
EPITHELIAL CELLS, UA: ABNORMAL /HPF
GLUCOSE URINE: NEGATIVE MG/DL
KETONES, URINE: NEGATIVE MG/DL
LEUKOCYTE ESTERASE, URINE: NEGATIVE
NITRITE, URINE: NEGATIVE
PH UA: 8.5 (ref 5–9)
PROTEIN UA: NEGATIVE MG/DL
RBC UA: >20 /HPF (ref 0–2)
SPECIFIC GRAVITY UA: 1.01 (ref 1–1.03)
UROBILINOGEN, URINE: 0.2 E.U./DL
WBC UA: ABNORMAL /HPF (ref 0–5)

## 2022-07-14 PROCEDURE — 96361 HYDRATE IV INFUSION ADD-ON: CPT

## 2022-07-14 PROCEDURE — 96360 HYDRATION IV INFUSION INIT: CPT

## 2022-07-14 PROCEDURE — 81001 URINALYSIS AUTO W/SCOPE: CPT

## 2022-07-14 PROCEDURE — 2580000003 HC RX 258: Performed by: OBSTETRICS & GYNECOLOGY

## 2022-07-14 PROCEDURE — 99202 OFFICE O/P NEW SF 15 MIN: CPT

## 2022-07-14 RX ORDER — SODIUM CHLORIDE, SODIUM LACTATE, POTASSIUM CHLORIDE, AND CALCIUM CHLORIDE .6; .31; .03; .02 G/100ML; G/100ML; G/100ML; G/100ML
500 INJECTION, SOLUTION INTRAVENOUS ONCE
Status: COMPLETED | OUTPATIENT
Start: 2022-07-14 | End: 2022-07-14

## 2022-07-14 RX ORDER — SODIUM CHLORIDE, SODIUM LACTATE, POTASSIUM CHLORIDE, CALCIUM CHLORIDE 600; 310; 30; 20 MG/100ML; MG/100ML; MG/100ML; MG/100ML
INJECTION, SOLUTION INTRAVENOUS CONTINUOUS
Status: DISCONTINUED | OUTPATIENT
Start: 2022-07-14 | End: 2022-07-14 | Stop reason: HOSPADM

## 2022-07-14 RX ADMIN — SODIUM CHLORIDE, POTASSIUM CHLORIDE, SODIUM LACTATE AND CALCIUM CHLORIDE: 600; 310; 30; 20 INJECTION, SOLUTION INTRAVENOUS at 18:07

## 2022-07-14 RX ADMIN — SODIUM CHLORIDE, POTASSIUM CHLORIDE, SODIUM LACTATE AND CALCIUM CHLORIDE 500 ML: 600; 310; 30; 20 INJECTION, SOLUTION INTRAVENOUS at 17:45

## 2022-07-14 NOTE — H&P
Subjective:      Nils Jenkins is an 23 y.o. female at 29 and 3/7 weeks gestation presenting with postcoital bleeding. Patient was seen earlier today in the office without any complaints. Left office around 1 PM and engaged in sexual activity soon afterwards. Bleeding started almost immediately after sexual activity. By the time patient arrived to labor and delivery bleeding and was further significantly. .She is also complaining of contractions every a few minutes lasting few seconds. Other associated symptoms include light bleeding. Fetal Movement: normal.  Past Medical History:   Diagnosis Date    Anxiety and depression 3/8/2021       Review of Systems  Pertinent items are noted in HPI. Objective:      BP (!) 102/55   Pulse 69   Temp 98.2 °F (36.8 °C) (Oral)   Resp 16   Ht 5' 3\" (1.6 m)   Wt 120 lb (54.4 kg)   LMP 01/27/2022   SpO2 99%   BMI 21.26 kg/m²   Blood pressure (!) 102/55, pulse 69, temperature 98.2 °F (36.8 °C), temperature source Oral, resp. rate 16, height 5' 3\" (1.6 m), weight 120 lb (54.4 kg), last menstrual period 01/27/2022, SpO2 99 %. General:   alert, appears stated age and cooperative   Cervix:   Deferred.    FHT:   140 BPM     Lab Review    CBC:   Lab Results   Component Value Date/Time    WBC 9.9 07/09/2022 12:45 PM    RBC 3.40 07/09/2022 12:45 PM    HGB 11.3 07/09/2022 12:45 PM    HCT 32.8 07/09/2022 12:45 PM    MCV 96.5 07/09/2022 12:45 PM    MCH 33.2 07/09/2022 12:45 PM    MCHC 34.5 07/09/2022 12:45 PM    RDW 13.3 07/09/2022 12:45 PM     07/09/2022 12:45 PM    MPV 9.6 07/09/2022 12:45 PM     CMP:    Lab Results   Component Value Date/Time     05/04/2022 05:59 AM    K 3.7 05/04/2022 05:59 AM    K 3.5 02/05/2022 09:37 AM     05/04/2022 05:59 AM    CO2 21 05/04/2022 05:59 AM    BUN 3 05/04/2022 05:59 AM    CREATININE 0.3 05/04/2022 05:59 AM    GFRAA >60 05/04/2022 05:59 AM    LABGLOM >60 05/04/2022 05:59 AM    GLUCOSE 100 07/09/2022 12:45 PM    GLUCOSE 79 05/04/2022 05:59 AM    PROT 6.8 05/04/2022 05:59 AM    LABALBU 4.0 05/04/2022 05:59 AM    CALCIUM 9.2 05/04/2022 05:59 AM    BILITOT 0.3 05/04/2022 05:59 AM    ALKPHOS 43 05/04/2022 05:59 AM    AST 23 05/04/2022 05:59 AM    ALT 13 05/04/2022 05:59 AM     U/A:    Lab Results   Component Value Date/Time    COLORU Yellow 07/14/2022 05:15 PM    PHUR 8.5 07/14/2022 05:15 PM    WBCUA 10-20 05/04/2022 05:59 AM    RBCUA 1-3 05/04/2022 05:59 AM    BACTERIA FEW 05/04/2022 05:59 AM    CLARITYU CLOUDY 07/14/2022 05:15 PM    SPECGRAV 1.010 07/14/2022 05:15 PM    LEUKOCYTESUR Negative 07/14/2022 05:15 PM    UROBILINOGEN 0.2 07/14/2022 05:15 PM    BILIRUBINUR Negative 07/14/2022 05:15 PM    BLOODU LARGE 07/14/2022 05:15 PM    GLUCOSEU Negative 07/14/2022 05:15 PM          Assessment:     28 weeks gestation  Postcoital bleeding    Plan:     Urinalysis  Observation    Fco Sosa MD,MD,7/14/2022 5:43 PM

## 2022-08-14 ENCOUNTER — HOSPITAL ENCOUNTER (EMERGENCY)
Age: 22
Discharge: HOME | End: 2022-08-15
Attending: EMERGENCY MEDICINE
Payer: COMMERCIAL

## 2022-08-14 VITALS
SYSTOLIC BLOOD PRESSURE: 114 MMHG | OXYGEN SATURATION: 99 % | DIASTOLIC BLOOD PRESSURE: 71 MMHG | RESPIRATION RATE: 18 BRPM | HEART RATE: 106 BPM | TEMPERATURE: 97.7 F

## 2022-08-14 DIAGNOSIS — R20.2 PARESTHESIAS: Primary | ICD-10-CM

## 2022-08-14 LAB
ALBUMIN SERPL-MCNC: 3.2 G/DL (ref 3.5–5.2)
ALP BLD-CCNC: 89 U/L (ref 35–104)
ALT SERPL-CCNC: 5 U/L (ref 0–32)
ANION GAP SERPL CALCULATED.3IONS-SCNC: 10 MMOL/L (ref 7–16)
AST SERPL-CCNC: 14 U/L (ref 0–31)
BACTERIA: ABNORMAL /HPF
BASOPHILS ABSOLUTE: 0.02 E9/L (ref 0–0.2)
BASOPHILS RELATIVE PERCENT: 0.2 % (ref 0–2)
BILIRUB SERPL-MCNC: 0.2 MG/DL (ref 0–1.2)
BILIRUBIN URINE: NEGATIVE
BLOOD, URINE: NEGATIVE
BUN BLDV-MCNC: 3 MG/DL (ref 6–20)
CALCIUM SERPL-MCNC: 8.9 MG/DL (ref 8.6–10.2)
CHLORIDE BLD-SCNC: 103 MMOL/L (ref 98–107)
CLARITY: CLEAR
CO2: 22 MMOL/L (ref 22–29)
COLOR: YELLOW
CREAT SERPL-MCNC: 0.2 MG/DL (ref 0.5–1)
EOSINOPHILS ABSOLUTE: 0.1 E9/L (ref 0.05–0.5)
EOSINOPHILS RELATIVE PERCENT: 0.9 % (ref 0–6)
EPITHELIAL CELLS, UA: ABNORMAL /HPF
GFR AFRICAN AMERICAN: >60
GFR NON-AFRICAN AMERICAN: >60 ML/MIN/1.73
GLUCOSE BLD-MCNC: 85 MG/DL (ref 74–99)
GLUCOSE URINE: NEGATIVE MG/DL
HCT VFR BLD CALC: 31.4 % (ref 34–48)
HEMOGLOBIN: 10.9 G/DL (ref 11.5–15.5)
IMMATURE GRANULOCYTES #: 0.06 E9/L
IMMATURE GRANULOCYTES %: 0.5 % (ref 0–5)
KETONES, URINE: NEGATIVE MG/DL
LEUKOCYTE ESTERASE, URINE: ABNORMAL
LYMPHOCYTES ABSOLUTE: 1.74 E9/L (ref 1.5–4)
LYMPHOCYTES RELATIVE PERCENT: 15.7 % (ref 20–42)
MCH RBC QN AUTO: 33.3 PG (ref 26–35)
MCHC RBC AUTO-ENTMCNC: 34.7 % (ref 32–34.5)
MCV RBC AUTO: 96 FL (ref 80–99.9)
MONOCYTES ABSOLUTE: 0.6 E9/L (ref 0.1–0.95)
MONOCYTES RELATIVE PERCENT: 5.4 % (ref 2–12)
MUCUS: PRESENT /LPF
NEUTROPHILS ABSOLUTE: 8.53 E9/L (ref 1.8–7.3)
NEUTROPHILS RELATIVE PERCENT: 77.3 % (ref 43–80)
NITRITE, URINE: NEGATIVE
PDW BLD-RTO: 13.2 FL (ref 11.5–15)
PH UA: 7 (ref 5–9)
PLATELET # BLD: 186 E9/L (ref 130–450)
PMV BLD AUTO: 9.9 FL (ref 7–12)
POTASSIUM SERPL-SCNC: 3.5 MMOL/L (ref 3.5–5)
PROTEIN UA: NEGATIVE MG/DL
RBC # BLD: 3.27 E12/L (ref 3.5–5.5)
RBC UA: ABNORMAL /HPF (ref 0–2)
SODIUM BLD-SCNC: 135 MMOL/L (ref 132–146)
SPECIFIC GRAVITY UA: 1.01 (ref 1–1.03)
TOTAL PROTEIN: 6.1 G/DL (ref 6.4–8.3)
UROBILINOGEN, URINE: 1 E.U./DL
WBC # BLD: 11.1 E9/L (ref 4.5–11.5)
WBC UA: ABNORMAL /HPF (ref 0–5)

## 2022-08-14 PROCEDURE — 36415 COLL VENOUS BLD VENIPUNCTURE: CPT

## 2022-08-14 PROCEDURE — 99283 EMERGENCY DEPT VISIT LOW MDM: CPT

## 2022-08-14 PROCEDURE — 81001 URINALYSIS AUTO W/SCOPE: CPT

## 2022-08-14 PROCEDURE — 85025 COMPLETE CBC W/AUTO DIFF WBC: CPT

## 2022-08-14 PROCEDURE — 80053 COMPREHEN METABOLIC PANEL: CPT

## 2022-08-14 ASSESSMENT — ENCOUNTER SYMPTOMS
EYE REDNESS: 0
NAUSEA: 0
EYE DISCHARGE: 0
SHORTNESS OF BREATH: 0
WHEEZING: 0
DIARRHEA: 0
ABDOMINAL DISTENTION: 0
SORE THROAT: 0
SINUS PRESSURE: 0
COUGH: 0
BACK PAIN: 0
EYE PAIN: 0
VOMITING: 0

## 2022-08-15 NOTE — ED PROVIDER NOTES
Patient is a 24 y/o female who presents to the ED with tingling in her right arm. Patient states that she had onset of tingling in her right arm tonight. She denies any falls or injury. She denies any neck pain. She also states that she has tingling on the back of her head. She denies any headache. She denies any weakness. Review of Systems   Constitutional:  Negative for chills and fever. HENT:  Negative for ear pain, sinus pressure and sore throat. Eyes:  Negative for pain, discharge and redness. Respiratory:  Negative for cough, shortness of breath and wheezing. Cardiovascular:  Negative for chest pain. Gastrointestinal:  Negative for abdominal distention, diarrhea, nausea and vomiting. Genitourinary:  Negative for dysuria and frequency. Musculoskeletal:  Negative for arthralgias and back pain. Skin:  Negative for rash and wound. Neurological:  Negative for weakness and headaches. Hematological:  Negative for adenopathy. All other systems reviewed and are negative. Physical Exam  Vitals and nursing note reviewed. Constitutional:       General: She is not in acute distress. HENT:      Head: Normocephalic and atraumatic. Right Ear: External ear normal.      Left Ear: External ear normal.      Nose: Nose normal.      Mouth/Throat:      Mouth: Mucous membranes are moist.   Eyes:      Conjunctiva/sclera: Conjunctivae normal.      Pupils: Pupils are equal, round, and reactive to light. Cardiovascular:      Rate and Rhythm: Normal rate and regular rhythm. Heart sounds: No murmur heard. Pulmonary:      Effort: Pulmonary effort is normal. No respiratory distress. Breath sounds: Normal breath sounds. No stridor. No wheezing, rhonchi or rales. Abdominal:      General: Bowel sounds are normal. There is no distension. Palpations: Abdomen is soft. Tenderness: There is no abdominal tenderness. There is no guarding.    Musculoskeletal:         General: Normal range of motion. Cervical back: Normal range of motion and neck supple. No tenderness. Skin:     General: Skin is warm and dry. Findings: No rash. Neurological:      Mental Status: She is alert and oriented to person, place, and time. Cranial Nerves: Cranial nerves are intact. Sensory: Sensation is intact. No sensory deficit. Motor: Motor function is intact. No weakness. Procedures     [facility]                  --------------------------------------------- PAST HISTORY ---------------------------------------------  Past Medical History:  has a past medical history of Anxiety and depression and PCB (post coital bleeding). Past Surgical History:  has a past surgical history that includes Tonsillectomy; Adenoidectomy; and Tympanostomy tube placement. Social History:  reports that she quit smoking about 6 months ago. She has never used smokeless tobacco. She reports that she does not drink alcohol and does not use drugs. Family History: Family history is unknown by patient. The patients home medications have been reviewed.     Allergies: Cefzil [cefprozil]    -------------------------------------------------- RESULTS -------------------------------------------------  Labs:  Results for orders placed or performed during the hospital encounter of 08/14/22   CBC with Auto Differential   Result Value Ref Range    WBC 11.1 4.5 - 11.5 E9/L    RBC 3.27 (L) 3.50 - 5.50 E12/L    Hemoglobin 10.9 (L) 11.5 - 15.5 g/dL    Hematocrit 31.4 (L) 34.0 - 48.0 %    MCV 96.0 80.0 - 99.9 fL    MCH 33.3 26.0 - 35.0 pg    MCHC 34.7 (H) 32.0 - 34.5 %    RDW 13.2 11.5 - 15.0 fL    Platelets 284 662 - 352 E9/L    MPV 9.9 7.0 - 12.0 fL    Neutrophils % 77.3 43.0 - 80.0 %    Immature Granulocytes % 0.5 0.0 - 5.0 %    Lymphocytes % 15.7 (L) 20.0 - 42.0 %    Monocytes % 5.4 2.0 - 12.0 %    Eosinophils % 0.9 0.0 - 6.0 %    Basophils % 0.2 0.0 - 2.0 %    Neutrophils Absolute 8.53 (H) 1.80 - 7.30 E9/L Interpretation: Normal      ------------------------------------------ PROGRESS NOTES ------------------------------------------  I have spoken with the patient and discussed todays results, in addition to providing specific details for the plan of care and counseling regarding the diagnosis and prognosis. Their questions are answered at this time and they are agreeable with the plan. I discussed at length with them reasons for immediate return here for re evaluation. They will followup with primary care by calling their office tomorrow. --------------------------------- ADDITIONAL PROVIDER NOTES ---------------------------------  At this time the patient is without objective evidence of an acute process requiring hospitalization or inpatient management. They have remained hemodynamically stable throughout their entire ED visit and are stable for discharge with outpatient follow-up. The plan has been discussed in detail and they are aware of the specific conditions for emergent return, as well as the importance of follow-up. New Prescriptions    No medications on file       Diagnosis:  1. Paresthesias        Disposition:  Patient's disposition: Discharge to home  Patient's condition is stable.          [address] [facility],   08/14/22 [id number]

## 2022-08-26 ENCOUNTER — APPOINTMENT (OUTPATIENT)
Dept: LABOR AND DELIVERY | Age: 22
End: 2022-08-26
Payer: COMMERCIAL

## 2022-08-26 ENCOUNTER — HOSPITAL ENCOUNTER (OUTPATIENT)
Age: 22
Discharge: HOME | End: 2022-08-26
Attending: OBSTETRICS & GYNECOLOGY | Admitting: OBSTETRICS & GYNECOLOGY
Payer: COMMERCIAL

## 2022-08-26 VITALS
BODY MASS INDEX: 23.04 KG/M2 | HEART RATE: 62 BPM | TEMPERATURE: 98.9 F | RESPIRATION RATE: 16 BRPM | HEIGHT: 63 IN | SYSTOLIC BLOOD PRESSURE: 97 MMHG | DIASTOLIC BLOOD PRESSURE: 52 MMHG | WEIGHT: 130 LBS

## 2022-08-26 PROBLEM — Z34.93 NORMAL PREGNANCY, THIRD TRIMESTER: Status: ACTIVE | Noted: 2022-08-26

## 2022-08-26 PROBLEM — Z86.73 HISTORY OF STROKE IN PRIOR 3 MONTHS: Status: ACTIVE | Noted: 2022-08-26

## 2022-08-26 PROBLEM — Z3A.34 34 WEEKS GESTATION OF PREGNANCY: Status: ACTIVE | Noted: 2022-08-26

## 2022-08-26 PROBLEM — Z34.93 NORMAL PREGNANCY, THIRD TRIMESTER: Status: RESOLVED | Noted: 2022-08-26 | Resolved: 2022-08-26

## 2022-08-26 PROCEDURE — 59025 FETAL NON-STRESS TEST: CPT

## 2022-08-26 NOTE — PROGRESS NOTES
Patient arrived ambulatory for NST for previous stroke. Patient is currently  at 34w4d. Patient placed on EFM for tracing. Patient states she has had cramping for a few days and denies any vaginal discharge or bleeding. Patient perceives fetal movement.

## 2022-08-26 NOTE — PROGRESS NOTES
Diagnosis:   Stroke in third trimester, currently on Lovenox    Result:  Reactive       Plan:  Discharge home    F/U as scheduled      Sachin Pappas MD

## 2022-09-01 ENCOUNTER — HOSPITAL ENCOUNTER (OUTPATIENT)
Age: 22
Discharge: HOME | End: 2022-09-01
Attending: OBSTETRICS & GYNECOLOGY | Admitting: OBSTETRICS & GYNECOLOGY
Payer: COMMERCIAL

## 2022-09-01 ENCOUNTER — APPOINTMENT (OUTPATIENT)
Dept: LABOR AND DELIVERY | Age: 22
End: 2022-09-01
Payer: COMMERCIAL

## 2022-09-01 VITALS
SYSTOLIC BLOOD PRESSURE: 102 MMHG | RESPIRATION RATE: 18 BRPM | TEMPERATURE: 98 F | DIASTOLIC BLOOD PRESSURE: 65 MMHG | HEART RATE: 69 BPM

## 2022-09-01 PROBLEM — O26.93 COMPLICATED PREGNANCY, THIRD TRIMESTER: Status: ACTIVE | Noted: 2022-09-01

## 2022-09-01 PROCEDURE — 59025 FETAL NON-STRESS TEST: CPT

## 2022-09-01 NOTE — PROGRESS NOTES
35w3d  EDC 10/3/22 ambulatory to L&D for scheduled NST due to stroke in pregnancy. EFM applied. Maternal perception of fetal movement noted. Patient denies bleeding, leaking of fluid, or contractions. Call light in reach.

## 2022-09-20 ENCOUNTER — ANESTHESIA (OUTPATIENT)
Dept: LABOR AND DELIVERY | Age: 22
DRG: 560 | End: 2022-09-20
Payer: COMMERCIAL

## 2022-09-20 ENCOUNTER — ANESTHESIA EVENT (OUTPATIENT)
Dept: LABOR AND DELIVERY | Age: 22
DRG: 560 | End: 2022-09-20
Payer: COMMERCIAL

## 2022-09-20 ENCOUNTER — HOSPITAL ENCOUNTER (INPATIENT)
Age: 22
LOS: 2 days | Discharge: HOME | DRG: 560 | End: 2022-09-22
Attending: OBSTETRICS & GYNECOLOGY | Admitting: OBSTETRICS & GYNECOLOGY
Payer: COMMERCIAL

## 2022-09-20 PROBLEM — Z3A.38 38 WEEKS GESTATION OF PREGNANCY: Status: ACTIVE | Noted: 2022-08-26

## 2022-09-20 PROBLEM — O26.93 COMPLICATED PREGNANCY, THIRD TRIMESTER: Status: RESOLVED | Noted: 2022-09-01 | Resolved: 2022-09-20

## 2022-09-20 PROBLEM — Z3A.28 28 WEEKS GESTATION OF PREGNANCY: Status: RESOLVED | Noted: 2022-07-14 | Resolved: 2022-09-20

## 2022-09-20 PROBLEM — Z34.93 NORMAL PREGNANCY, THIRD TRIMESTER: Status: ACTIVE | Noted: 2022-09-20

## 2022-09-20 PROBLEM — O09.893 NONCOMPLIANT PREGNANT PATIENT IN THIRD TRIMESTER: Status: ACTIVE | Noted: 2022-09-20

## 2022-09-20 PROBLEM — Z34.93 NORMAL PREGNANCY, THIRD TRIMESTER: Status: RESOLVED | Noted: 2022-09-20 | Resolved: 2022-09-20

## 2022-09-20 PROBLEM — Z91.199 NONCOMPLIANT PREGNANT PATIENT IN THIRD TRIMESTER: Status: ACTIVE | Noted: 2022-09-20

## 2022-09-20 LAB
ABO/RH: NORMAL
ALBUMIN SERPL-MCNC: 3.1 G/DL (ref 3.5–5.2)
ALP BLD-CCNC: 138 U/L (ref 35–104)
ALT SERPL-CCNC: <5 U/L (ref 0–32)
AMPHETAMINE SCREEN, URINE: NOT DETECTED
ANION GAP SERPL CALCULATED.3IONS-SCNC: 11 MMOL/L (ref 7–16)
ANTIBODY SCREEN: NORMAL
AST SERPL-CCNC: 17 U/L (ref 0–31)
BARBITURATE SCREEN URINE: NOT DETECTED
BENZODIAZEPINE SCREEN, URINE: NOT DETECTED
BILIRUB SERPL-MCNC: 0.4 MG/DL (ref 0–1.2)
BUN BLDV-MCNC: 6 MG/DL (ref 6–20)
CALCIUM SERPL-MCNC: 9.1 MG/DL (ref 8.6–10.2)
CANNABINOID SCREEN URINE: NOT DETECTED
CHLORIDE BLD-SCNC: 108 MMOL/L (ref 98–107)
CO2: 20 MMOL/L (ref 22–29)
COCAINE METABOLITE SCREEN URINE: NOT DETECTED
CREAT SERPL-MCNC: 0.4 MG/DL (ref 0.5–1)
FENTANYL SCREEN, URINE: NOT DETECTED
GFR AFRICAN AMERICAN: >60
GFR NON-AFRICAN AMERICAN: >60 ML/MIN/1.73
GLUCOSE BLD-MCNC: 83 MG/DL (ref 74–99)
HCT VFR BLD CALC: 30.8 % (ref 34–48)
HEMOGLOBIN: 10.9 G/DL (ref 11.5–15.5)
Lab: NORMAL
MCH RBC QN AUTO: 34.3 PG (ref 26–35)
MCHC RBC AUTO-ENTMCNC: 35.4 % (ref 32–34.5)
MCV RBC AUTO: 96.9 FL (ref 80–99.9)
METHADONE SCREEN, URINE: NOT DETECTED
OPIATE SCREEN URINE: NOT DETECTED
OXYCODONE URINE: NOT DETECTED
PDW BLD-RTO: 13.2 FL (ref 11.5–15)
PHENCYCLIDINE SCREEN URINE: NOT DETECTED
PLATELET # BLD: 170 E9/L (ref 130–450)
PMV BLD AUTO: 10.7 FL (ref 7–12)
POTASSIUM SERPL-SCNC: 3.2 MMOL/L (ref 3.5–5)
RBC # BLD: 3.18 E12/L (ref 3.5–5.5)
SODIUM BLD-SCNC: 139 MMOL/L (ref 132–146)
TOTAL PROTEIN: 5.8 G/DL (ref 6.4–8.3)
WBC # BLD: 8.6 E9/L (ref 4.5–11.5)

## 2022-09-20 PROCEDURE — 2500000003 HC RX 250 WO HCPCS: Performed by: ANESTHESIOLOGY

## 2022-09-20 PROCEDURE — 7200000001 HC VAGINAL DELIVERY

## 2022-09-20 PROCEDURE — 1220000001 HC SEMI PRIVATE L&D R&B

## 2022-09-20 PROCEDURE — 3700000025 EPIDURAL BLOCK: Performed by: ANESTHESIOLOGY

## 2022-09-20 PROCEDURE — 85027 COMPLETE CBC AUTOMATED: CPT

## 2022-09-20 PROCEDURE — 6360000002 HC RX W HCPCS: Performed by: OBSTETRICS & GYNECOLOGY

## 2022-09-20 PROCEDURE — 6370000000 HC RX 637 (ALT 250 FOR IP): Performed by: OBSTETRICS & GYNECOLOGY

## 2022-09-20 PROCEDURE — 2500000003 HC RX 250 WO HCPCS: Performed by: OBSTETRICS & GYNECOLOGY

## 2022-09-20 PROCEDURE — 80053 COMPREHEN METABOLIC PANEL: CPT

## 2022-09-20 PROCEDURE — 86850 RBC ANTIBODY SCREEN: CPT

## 2022-09-20 PROCEDURE — 3E033VJ INTRODUCTION OF OTHER HORMONE INTO PERIPHERAL VEIN, PERCUTANEOUS APPROACH: ICD-10-PCS | Performed by: OBSTETRICS & GYNECOLOGY

## 2022-09-20 PROCEDURE — 10907ZC DRAINAGE OF AMNIOTIC FLUID, THERAPEUTIC FROM PRODUCTS OF CONCEPTION, VIA NATURAL OR ARTIFICIAL OPENING: ICD-10-PCS | Performed by: OBSTETRICS & GYNECOLOGY

## 2022-09-20 PROCEDURE — 2500000003 HC RX 250 WO HCPCS

## 2022-09-20 PROCEDURE — 36415 COLL VENOUS BLD VENIPUNCTURE: CPT

## 2022-09-20 PROCEDURE — 80307 DRUG TEST PRSMV CHEM ANLYZR: CPT

## 2022-09-20 PROCEDURE — 2580000003 HC RX 258: Performed by: OBSTETRICS & GYNECOLOGY

## 2022-09-20 PROCEDURE — 86901 BLOOD TYPING SEROLOGIC RH(D): CPT

## 2022-09-20 PROCEDURE — 86900 BLOOD TYPING SEROLOGIC ABO: CPT

## 2022-09-20 RX ORDER — MODIFIED LANOLIN
OINTMENT (GRAM) TOPICAL PRN
Status: DISCONTINUED | OUTPATIENT
Start: 2022-09-20 | End: 2022-09-22 | Stop reason: HOSPADM

## 2022-09-20 RX ORDER — SODIUM CHLORIDE 0.9 % (FLUSH) 0.9 %
5-40 SYRINGE (ML) INJECTION PRN
Status: DISCONTINUED | OUTPATIENT
Start: 2022-09-20 | End: 2022-09-20

## 2022-09-20 RX ORDER — SODIUM CHLORIDE, SODIUM LACTATE, POTASSIUM CHLORIDE, AND CALCIUM CHLORIDE .6; .31; .03; .02 G/100ML; G/100ML; G/100ML; G/100ML
500 INJECTION, SOLUTION INTRAVENOUS PRN
Status: DISCONTINUED | OUTPATIENT
Start: 2022-09-20 | End: 2022-09-20

## 2022-09-20 RX ORDER — SODIUM CHLORIDE, SODIUM LACTATE, POTASSIUM CHLORIDE, AND CALCIUM CHLORIDE .6; .31; .03; .02 G/100ML; G/100ML; G/100ML; G/100ML
1000 INJECTION, SOLUTION INTRAVENOUS PRN
Status: DISCONTINUED | OUTPATIENT
Start: 2022-09-20 | End: 2022-09-20

## 2022-09-20 RX ORDER — CARBOPROST TROMETHAMINE 250 UG/ML
250 INJECTION, SOLUTION INTRAMUSCULAR PRN
Status: DISCONTINUED | OUTPATIENT
Start: 2022-09-20 | End: 2022-09-20

## 2022-09-20 RX ORDER — SODIUM CHLORIDE 9 MG/ML
25 INJECTION, SOLUTION INTRAVENOUS PRN
Status: DISCONTINUED | OUTPATIENT
Start: 2022-09-20 | End: 2022-09-20

## 2022-09-20 RX ORDER — DOCUSATE SODIUM 100 MG/1
100 CAPSULE, LIQUID FILLED ORAL 2 TIMES DAILY
Status: DISCONTINUED | OUTPATIENT
Start: 2022-09-20 | End: 2022-09-20

## 2022-09-20 RX ORDER — CLINDAMYCIN PHOSPHATE 900 MG/50ML
INJECTION INTRAVENOUS
Status: COMPLETED
Start: 2022-09-20 | End: 2022-09-20

## 2022-09-20 RX ORDER — IBUPROFEN 800 MG/1
800 TABLET ORAL EVERY 8 HOURS PRN
Status: DISCONTINUED | OUTPATIENT
Start: 2022-09-20 | End: 2022-09-22 | Stop reason: HOSPADM

## 2022-09-20 RX ORDER — FERROUS SULFATE 325(65) MG
325 TABLET ORAL 2 TIMES DAILY WITH MEALS
Status: DISCONTINUED | OUTPATIENT
Start: 2022-09-21 | End: 2022-09-22 | Stop reason: HOSPADM

## 2022-09-20 RX ORDER — ONDANSETRON 2 MG/ML
4 INJECTION INTRAMUSCULAR; INTRAVENOUS EVERY 6 HOURS PRN
Status: DISCONTINUED | OUTPATIENT
Start: 2022-09-20 | End: 2022-09-20

## 2022-09-20 RX ORDER — SODIUM CHLORIDE 0.9 % (FLUSH) 0.9 %
5-40 SYRINGE (ML) INJECTION EVERY 12 HOURS SCHEDULED
Status: DISCONTINUED | OUTPATIENT
Start: 2022-09-20 | End: 2022-09-20

## 2022-09-20 RX ORDER — SODIUM CHLORIDE 0.9 % (FLUSH) 0.9 %
5-40 SYRINGE (ML) INJECTION PRN
Status: DISCONTINUED | OUTPATIENT
Start: 2022-09-20 | End: 2022-09-22 | Stop reason: HOSPADM

## 2022-09-20 RX ORDER — ONDANSETRON 4 MG/1
8 TABLET, ORALLY DISINTEGRATING ORAL EVERY 8 HOURS PRN
Status: DISCONTINUED | OUTPATIENT
Start: 2022-09-20 | End: 2022-09-22 | Stop reason: HOSPADM

## 2022-09-20 RX ORDER — NALBUPHINE HCL 10 MG/ML
5 AMPUL (ML) INJECTION
Status: DISCONTINUED | OUTPATIENT
Start: 2022-09-20 | End: 2022-09-20

## 2022-09-20 RX ORDER — MISOPROSTOL 200 UG/1
800 TABLET ORAL PRN
Status: DISCONTINUED | OUTPATIENT
Start: 2022-09-20 | End: 2022-09-20

## 2022-09-20 RX ORDER — ASPIRIN 81 MG/1
160 TABLET, CHEWABLE ORAL DAILY
COMMUNITY

## 2022-09-20 RX ORDER — ENOXAPARIN SODIUM 100 MG/ML
40 INJECTION SUBCUTANEOUS DAILY
Status: DISCONTINUED | OUTPATIENT
Start: 2022-09-20 | End: 2022-09-22 | Stop reason: HOSPADM

## 2022-09-20 RX ORDER — DOCUSATE SODIUM 100 MG/1
100 CAPSULE, LIQUID FILLED ORAL 2 TIMES DAILY
Status: DISCONTINUED | OUTPATIENT
Start: 2022-09-20 | End: 2022-09-22 | Stop reason: HOSPADM

## 2022-09-20 RX ORDER — METHYLERGONOVINE MALEATE 0.2 MG/ML
200 INJECTION INTRAVENOUS PRN
Status: DISCONTINUED | OUTPATIENT
Start: 2022-09-20 | End: 2022-09-20

## 2022-09-20 RX ORDER — SODIUM CHLORIDE 0.9 % (FLUSH) 0.9 %
5-40 SYRINGE (ML) INJECTION EVERY 12 HOURS SCHEDULED
Status: DISCONTINUED | OUTPATIENT
Start: 2022-09-20 | End: 2022-09-22 | Stop reason: HOSPADM

## 2022-09-20 RX ORDER — CLINDAMYCIN PHOSPHATE 900 MG/50ML
900 INJECTION INTRAVENOUS EVERY 8 HOURS
Status: DISCONTINUED | OUTPATIENT
Start: 2022-09-20 | End: 2022-09-20

## 2022-09-20 RX ORDER — SODIUM CHLORIDE 9 MG/ML
INJECTION, SOLUTION INTRAVENOUS PRN
Status: DISCONTINUED | OUTPATIENT
Start: 2022-09-20 | End: 2022-09-22 | Stop reason: HOSPADM

## 2022-09-20 RX ORDER — SODIUM CHLORIDE, SODIUM LACTATE, POTASSIUM CHLORIDE, CALCIUM CHLORIDE 600; 310; 30; 20 MG/100ML; MG/100ML; MG/100ML; MG/100ML
INJECTION, SOLUTION INTRAVENOUS CONTINUOUS
Status: DISCONTINUED | OUTPATIENT
Start: 2022-09-20 | End: 2022-09-20

## 2022-09-20 RX ORDER — ACETAMINOPHEN 500 MG
1000 TABLET ORAL EVERY 8 HOURS PRN
Status: DISCONTINUED | OUTPATIENT
Start: 2022-09-20 | End: 2022-09-22 | Stop reason: HOSPADM

## 2022-09-20 RX ORDER — NALOXONE HYDROCHLORIDE 0.4 MG/ML
INJECTION, SOLUTION INTRAMUSCULAR; INTRAVENOUS; SUBCUTANEOUS PRN
Status: DISCONTINUED | OUTPATIENT
Start: 2022-09-20 | End: 2022-09-20

## 2022-09-20 RX ADMIN — SODIUM CHLORIDE, POTASSIUM CHLORIDE, SODIUM LACTATE AND CALCIUM CHLORIDE: 600; 310; 30; 20 INJECTION, SOLUTION INTRAVENOUS at 10:00

## 2022-09-20 RX ADMIN — Medication 5 ML: at 18:06

## 2022-09-20 RX ADMIN — Medication 10 ML: at 18:02

## 2022-09-20 RX ADMIN — DOCUSATE SODIUM 100 MG: 100 CAPSULE, LIQUID FILLED ORAL at 20:54

## 2022-09-20 RX ADMIN — SODIUM CHLORIDE, POTASSIUM CHLORIDE, SODIUM LACTATE AND CALCIUM CHLORIDE: 600; 310; 30; 20 INJECTION, SOLUTION INTRAVENOUS at 17:16

## 2022-09-20 RX ADMIN — Medication 15 ML/HR: at 18:07

## 2022-09-20 RX ADMIN — IBUPROFEN 800 MG: 800 TABLET, FILM COATED ORAL at 20:54

## 2022-09-20 RX ADMIN — CLINDAMYCIN PHOSPHATE 900 MG: 900 INJECTION, SOLUTION INTRAVENOUS at 18:23

## 2022-09-20 RX ADMIN — CLINDAMYCIN PHOSPHATE 900 MG: 900 INJECTION, SOLUTION INTRAVENOUS at 10:22

## 2022-09-20 RX ADMIN — NALBUPHINE HYDROCHLORIDE 5 MG: 10 INJECTION, SOLUTION INTRAMUSCULAR; INTRAVENOUS; SUBCUTANEOUS at 13:55

## 2022-09-20 RX ADMIN — ENOXAPARIN SODIUM 40 MG: 100 INJECTION SUBCUTANEOUS at 20:56

## 2022-09-20 RX ADMIN — Medication 2 MILLI-UNITS/MIN: at 10:19

## 2022-09-20 ASSESSMENT — PAIN DESCRIPTION - LOCATION
LOCATION: BACK
LOCATION: PERINEUM

## 2022-09-20 ASSESSMENT — LIFESTYLE VARIABLES: SMOKING_STATUS: 1

## 2022-09-20 ASSESSMENT — PAIN - FUNCTIONAL ASSESSMENT
PAIN_FUNCTIONAL_ASSESSMENT: ACTIVITIES ARE NOT PREVENTED
PAIN_FUNCTIONAL_ASSESSMENT: ACTIVITIES ARE NOT PREVENTED

## 2022-09-20 ASSESSMENT — PAIN SCALES - GENERAL
PAINLEVEL_OUTOF10: 3
PAINLEVEL_OUTOF10: 8

## 2022-09-20 NOTE — PROGRESS NOTES
Patient presented to unit for scheduled induction of labor. Perceives fetal movement. Denies bleeding or leaking fluid or contractions. States she had a stroke 5 weeks ago and was hospitalized for a week in [place] . Was prescribed Lovenox and baby ASA but has not been taking her Lovenox because \"the shot hurts. \" Denies deficits from the stroke. Plan of care reviewed. Patient states she is declining pitocin. Call placed to Dr. Denise Diaz and he spoke with patient by phone to explain Induction protocol. Patient agrees to pitocin. Family present and supportive, reassurance given and all questions answered.

## 2022-09-20 NOTE — L&D DELIVERY NOTE
Department of Obstetrics and Gynecology  Spontaneous Vaginal Delivery Note    Labor & Delivery Summary  Dilation Complete Date: 22  Dilation Complete Time:     Pre-operative Diagnosis:  Term pregnancy, Induced labor, Single fetus, and Pregnancy complicated by: Non compliance, stroke in current pregnancy and unknown GBS status    Post-operative Diagnosis:  Same + live female wt 6 #,2 oz  Procedure:  Spontaneous vaginal delivery or Midline episiotomy and repair    Surgeon:  Brian Black MD    Anesthesia:  epidural anesthesia    Estimated blood loss:  300ml    Specimen:  Placenta not sent to pathology     Cord blood sent Yes    Complications:  none    Condition:  infant stable to general nursery and mother stable    Details of Procedure: The patient is a 23 y.o. female at 43w4d   OB History          1    Para   0    Term                AB        Living             SAB        IAB        Ectopic        Molar        Multiple        Live Births                 who was admitted for induction. She received the following interventions: ARBOW and IV Pitocin induction She was known to be GBS negative and did receive antibiotic prophylaxis. The patient progressed well,did receive an epidural, became complete and started to push. After pushing for 15 minutes the fetal head was at the perineum, nose and mouth suctioned with bulb suction and the rest of the infant delivered atraumatically, placed on mother abdomen. Cord was clamped and cut and infant handed off to the waiting nurse for evaluation. The delivery of the placenta was spontaneous. The perineum and vagina were explored and a second degree episiotomy was repaired in standard fashion.

## 2022-09-20 NOTE — H&P
Subjective:      Quang Marshall is an 23 y.o. female at 45 and 1/7 weeks gestation presenting with   Chief Complaint   Patient presents with    Scheduled Induction   Patient has been noncompliant with prenatal care. Missed her last 3 appointments. History of stroke in the current pregnancy. Was prescribed Lovenox but she never took. Was asked to have nonstress tests but she never showed up. Present to the office yesterday complaining of lower abdominal cramps. Cervical examination confirmed the cervix to be about 3 cm dilated about 80% effaced. Patient was admitted today for augmentation. Unknown GBS status. patient is allergic to penicillin and has received 1 dose of clindamycin and scheduled to receive her second dose in about half an hour. Pitocin was started this morning. She is also complaining of contractions every a few minutes lasting few seconds. Other associated symptoms include low back pain. Fetal Movement: normal.  Patient's medications, allergies, past medical, surgical, social and family histories were reviewed and updated as appropriate. Review of Systems  Pertinent items are noted in HPI. Objective:      /76   Pulse 55   Temp 97.7 °F (36.5 °C) (Oral)   Resp 16   LMP 01/27/2022   Blood pressure 114/76, pulse 55, temperature 97.7 °F (36.5 °C), temperature source Oral, resp. rate 16, last menstrual period 01/27/2022. General:   alert, appears stated age, and cooperative   Cervix:  5 to 6 cm, 90% effaced, bulging membranes, vertex at 0 station. AROM done, clear fluids.    FHT:  140 BPM     Lab Review    CBC:   Lab Results   Component Value Date/Time    WBC 8.6 09/20/2022 09:50 AM    RBC 3.18 09/20/2022 09:50 AM    HGB 10.9 09/20/2022 09:50 AM    HCT 30.8 09/20/2022 09:50 AM    MCV 96.9 09/20/2022 09:50 AM    MCH 34.3 09/20/2022 09:50 AM    MCHC 35.4 09/20/2022 09:50 AM    RDW 13.2 09/20/2022 09:50 AM     09/20/2022 09:50 AM    MPV 10.7 09/20/2022 09:50 AM     CMP:    Lab Results   Component Value Date/Time     09/20/2022 09:50 AM    K 3.2 09/20/2022 09:50 AM    K 3.5 02/05/2022 09:37 AM     09/20/2022 09:50 AM    CO2 20 09/20/2022 09:50 AM    BUN 6 09/20/2022 09:50 AM    CREATININE 0.4 09/20/2022 09:50 AM    GFRAA >60 09/20/2022 09:50 AM    LABGLOM >60 09/20/2022 09:50 AM    GLUCOSE 83 09/20/2022 09:50 AM    PROT 5.8 09/20/2022 09:50 AM    LABALBU 3.1 09/20/2022 09:50 AM    CALCIUM 9.1 09/20/2022 09:50 AM    BILITOT 0.4 09/20/2022 09:50 AM    ALKPHOS 138 09/20/2022 09:50 AM    AST 17 09/20/2022 09:50 AM    ALT <5 09/20/2022 09:50 AM     U/A:    Lab Results   Component Value Date/Time    COLORU Yellow 08/14/2022 10:30 PM    PHUR 7.0 08/14/2022 10:30 PM    WBCUA 1-3 08/14/2022 10:30 PM    RBCUA NONE 08/14/2022 10:30 PM    MUCUS Present 08/14/2022 10:30 PM    BACTERIA MODERATE 08/14/2022 10:30 PM    CLARITYU Clear 08/14/2022 10:30 PM    SPECGRAV 1.010 08/14/2022 10:30 PM    LEUKOCYTESUR SMALL 08/14/2022 10:30 PM    UROBILINOGEN 1.0 08/14/2022 10:30 PM    BILIRUBINUR Negative 08/14/2022 10:30 PM    BLOODU Negative 08/14/2022 10:30 PM    GLUCOSEU Negative 08/14/2022 10:30 PM          Assessment:     Active labor   38+ weeks gestation  History of stroke in current pregnancy     Plan:     Continue with Pitocin augmentation, epidural if desired    Fco Sosa MD,MD,9/20/2022 5:30 PM

## 2022-09-20 NOTE — PROGRESS NOTES
Single live born female delivered vaginally at 80. Bulb suction and tactile stimulation performed on  on mother's abdomen. Apgar's 9/9.

## 2022-09-20 NOTE — ANESTHESIA PRE PROCEDURE
Department of Anesthesiology  Preprocedure Note       Name:  Darlene Kramer   Age:  23 y.o.  :  2000                                          MRN:  [id number]         Date:  2022      Surgeon: Consuelo Pettit    Procedure: LABOR OPTIONS (TRACI)    Medications prior to admission:   Prior to Admission medications    Medication Sig Start Date End Date Taking?  Authorizing Provider   aspirin 81 MG chewable tablet Take 160 mg by mouth daily   Yes Historical Provider, MD   Prenatal Vit-Fe Fumarate-FA (PRENATAL VITAMINS) 28-0.8 MG TABS Take 1 tablet by mouth daily 22   Timothy Yip APRN - CNP       Current medications:    Current Facility-Administered Medications   Medication Dose Route Frequency Provider Last Rate Last Admin    lactated ringers infusion   IntraVENous Continuous Amine SARIKA Sosa MD   Stopped at 22 1022    lactated ringers bolus  500 mL IntraVENous PRN Amine SARIKA Sosa MD        Or    lactated ringers bolus  1,000 mL IntraVENous PRN Amine SARIKA Sosa MD        sodium chloride flush 0.9 % injection 5-40 mL  5-40 mL IntraVENous 2 times per day Amine SARIKA Sosa MD        sodium chloride flush 0.9 % injection 5-40 mL  5-40 mL IntraVENous PRN Amine SARIKA Sosa MD        0.9 % sodium chloride infusion  25 mL IntraVENous PRN Fco Sosa MD        methylergonovine (METHERGINE) injection 200 mcg  200 mcg IntraMUSCular PRN Amine SARIKA Sosa MD        carboprost (HEMABATE) injection 250 mcg  250 mcg IntraMUSCular PRERNST Sosa MD        miSOPROStol (CYTOTEC) tablet 800 mcg  800 mcg Rectal PRN Fco Sosa MD        tranexamic acid (CYKLOKAPRON) 1,000 mg in sodium chloride 0.9 % 100 mL IVPB  1,000 mg IntraVENous Once PRN Fco Sosa MD        oxytocin (PITOCIN) 30 units in 500 mL infusion  87.3 evangelina-units/min IntraVENous Continuous PRN Amine SARIKA Sosa MD        And    oxytocin (PITOCIN) 30 units in 500 mL infusion - BOLUS FROM THE BAG  10 Units IntraVENous PRN Amine SARIKA Sosa MD        ondansetron (ZOFRAN) injection 4 mg  4 mg IntraVENous Q6H PRN Fco Sosa MD        docusate sodium (COLACE) capsule 100 mg  100 mg Oral BID Fco Sosa MD        clindamycin (CLEOCIN) 900 mg in dextrose 5 % 50 mL IVPB  900 mg IntraVENous Q8H Fco Sosa MD   Stopped at 09/20/22 1122    oxytocin (PITOCIN) 30 units in 500 mL infusion  1-20 evangelina-units/min IntraVENous Continuous Fco Sosa MD 16 mL/hr at 09/20/22 1615 16 evangelina-units/min at 09/20/22 1615    nalbuphine (NUBAIN) injection 5 mg  5 mg IntraVENous Q3H PRN Fco Sosa MD   5 mg at 09/20/22 1355    naloxone 0.4 mg in 10 mL sodium chloride syringe   IntraVENous PRN Aleida Wallace MD        ondansetron [facility]) injection 4 mg  4 mg IntraVENous Q6H PRN Aleida Wallace MD        fentaNYL 1.85mcg/ml and bupivacaine 0.1% 15ml syringe (Home [facility]) (OB) 15 mL epidural  15 mL Epidural Once Aleida Wallace MD        fentaNYL 1.85mcg/ml and Bupivicaine 0.1% in 0.9% NS 135ml infusion (OB) epidural  15 mL/hr Epidural Continuous Aleida Wallace MD           Allergies: Allergies   Allergen Reactions    Cefzil [Cefprozil]      rash       Problem List:    Patient Active Problem List   Diagnosis Code    Anxiety and depression F41.9, F32. A    28 weeks gestation of pregnancy Z3A.28    PCB (post coital bleeding) N93.0    History of stroke in prior 3 months Z86.73    34 weeks gestation of pregnancy A6V.30    Complicated pregnancy, third trimester O26.93    Normal pregnancy, third trimester Z34.93       Past Medical History:        Diagnosis Date    29 weeks gestation of pregnancy 8/26/2022    Anxiety and depression 3/8/2021    History of stroke in prior 3 months 8/26/2022    PCB (post coital bleeding) 7/14/2022       Past Surgical History:        Procedure Laterality Date    ADENOIDECTOMY      TONSILLECTOMY      TYMPANOSTOMY TUBE PLACEMENT         Social History:    Social History Tobacco Use    Smoking status: Former     Types: Cigarettes     Quit date: 2022     Years since quittin.6    Smokeless tobacco: Never    Tobacco comments:     vape   Substance Use Topics    Alcohol use:  No                                Counseling given: Not Answered  Tobacco comments: vape      Vital Signs (Current):   Vitals:    22 1143 22 1213 22 1315 22 1513   BP: (!) 90/54 104/75 108/66 (!) 107/59   Pulse: 61 67 64 58   Resp:    16   Temp:    36.5 °C (97.7 °F)   TempSrc:    Oral                                              BP Readings from Last 3 Encounters:   22 (!) 107/59   22 102/65   22 (!) 97/52       NPO Status: Time of last liquid consumption: 1600                        Time of last solid consumption: 0800                        Date of last liquid consumption: 22                        Date of last solid food consumption: 22    BMI:   Wt Readings from Last 3 Encounters:   22 130 lb (59 kg)   22 120 lb (54.4 kg)   22 140 lb (63.5 kg)     There is no height or weight on file to calculate BMI.    CBC:   Lab Results   Component Value Date/Time    WBC 8.6 2022 09:50 AM    RBC 3.18 2022 09:50 AM    HGB 10.9 2022 09:50 AM    HCT 30.8 2022 09:50 AM    MCV 96.9 2022 09:50 AM    RDW 13.2 2022 09:50 AM     2022 09:50 AM       CMP:   Lab Results   Component Value Date/Time     2022 09:50 AM    K 3.2 2022 09:50 AM    K 3.5 2022 09:37 AM     2022 09:50 AM    CO2 20 2022 09:50 AM    BUN 6 2022 09:50 AM    CREATININE 0.4 2022 09:50 AM    GFRAA >60 2022 09:50 AM    LABGLOM >60 2022 09:50 AM    GLUCOSE 83 2022 09:50 AM    PROT 5.8 2022 09:50 AM    CALCIUM 9.1 2022 09:50 AM    BILITOT 0.4 2022 09:50 AM    ALKPHOS 138 2022 09:50 AM    AST 17 2022 09:50 AM    ALT <5 2022 09:50 AM POC Tests: No results for input(s): POCGLU, POCNA, POCK, POCCL, POCBUN, POCHEMO, POCHCT in the last 72 hours. Coags: No results found for: PROTIME, INR, APTT    HCG (If Applicable):   Lab Results   Component Value Date    PREGTESTUR positive 02/02/2022        ABGs: No results found for: PHART, PO2ART, MRX6ASM, GGZ8NMG, BEART, O8GMVLIR     Type & Screen (If Applicable):  No results found for: LABABO, LABRH    Drug/Infectious Status (If Applicable):  No results found for: HIV, HEPCAB    COVID-19 Screening (If Applicable): No results found for: COVID19        Anesthesia Evaluation  Patient summary reviewed no history of anesthetic complications:   Airway: Mallampati: I  TM distance: >3 FB   Neck ROM: full  Mouth opening: > = 3 FB   Dental: normal exam     Comment: Pt denies any loose/chipped teeth      Pulmonary: breath sounds clear to auscultation  (+) current smoker (vapes QD)          Patient smoked on day of surgery. ROS comment: Former smoker     Cardiovascular:Negative CV ROS  Exercise tolerance: good (>4 METS),       (-) hypertension, valvular problems/murmurs and dysrhythmias      Rhythm: regular  Rate: normal           Beta Blocker:  Not on Beta Blocker         Neuro/Psych:   (+) CVA (5 wks ago, hospitalized in [facility], symptoms resolved completely):, psychiatric history: stable without treatmentdepression/anxiety              ROS comment: C/o intermittent back pain before pregnancy, OTC PRN   GI/Hepatic/Renal:   (+) GERD: well controlled,      (-) liver disease and no renal disease       Endo/Other:    (+) blood dyscrasia (ASA, pt prescribed lovenox, refusing d/t pain. NO dose>24 hours; 10.9/30.8): anemia:., electrolyte abnormalities (K3.2), . Pt had no PAT visit       Abdominal:       Abdomen: soft. Vascular: negative vascular ROS.          Other Findings:           Anesthesia Plan      epidural     ASA 3         continuous noninvasive hemodynamic monitor    Anesthetic plan and risks discussed with patient. Use of blood products discussed with patient whom consented to blood products.      Attending anesthesiologist reviewed and agrees with Preprocedure content      Post-op pain plan if not by surgeon: epidural opioid            JL Rosario - CRNA   9/20/2022

## 2022-09-20 NOTE — PROGRESS NOTES
Pitocin started per order. Patient verbalizes understanding of plan of care. No co's offered. FHT and Uterine activity assessed q15 min per protocol.

## 2022-09-21 LAB
HCT VFR BLD CALC: 30 % (ref 34–48)
HEMOGLOBIN: 10.5 G/DL (ref 11.5–15.5)

## 2022-09-21 PROCEDURE — 36415 COLL VENOUS BLD VENIPUNCTURE: CPT

## 2022-09-21 PROCEDURE — 2580000003 HC RX 258: Performed by: OBSTETRICS & GYNECOLOGY

## 2022-09-21 PROCEDURE — 85014 HEMATOCRIT: CPT

## 2022-09-21 PROCEDURE — 85018 HEMOGLOBIN: CPT

## 2022-09-21 PROCEDURE — 1220000001 HC SEMI PRIVATE L&D R&B

## 2022-09-21 PROCEDURE — 6370000000 HC RX 637 (ALT 250 FOR IP): Performed by: OBSTETRICS & GYNECOLOGY

## 2022-09-21 PROCEDURE — 6360000002 HC RX W HCPCS: Performed by: OBSTETRICS & GYNECOLOGY

## 2022-09-21 RX ADMIN — ENOXAPARIN SODIUM 40 MG: 100 INJECTION SUBCUTANEOUS at 22:31

## 2022-09-21 RX ADMIN — DOCUSATE SODIUM 100 MG: 100 CAPSULE, LIQUID FILLED ORAL at 22:31

## 2022-09-21 RX ADMIN — IBUPROFEN 800 MG: 800 TABLET, FILM COATED ORAL at 13:39

## 2022-09-21 RX ADMIN — ACETAMINOPHEN 1000 MG: 500 TABLET ORAL at 07:50

## 2022-09-21 RX ADMIN — SODIUM CHLORIDE, PRESERVATIVE FREE 10 ML: 5 INJECTION INTRAVENOUS at 07:41

## 2022-09-21 RX ADMIN — ACETAMINOPHEN 1000 MG: 500 TABLET ORAL at 22:46

## 2022-09-21 RX ADMIN — DOCUSATE SODIUM 100 MG: 100 CAPSULE, LIQUID FILLED ORAL at 07:41

## 2022-09-21 ASSESSMENT — PAIN DESCRIPTION - LOCATION
LOCATION: PERINEUM
LOCATION: ABDOMEN
LOCATION: ABDOMEN

## 2022-09-21 ASSESSMENT — PAIN SCALES - GENERAL
PAINLEVEL_OUTOF10: 3
PAINLEVEL_OUTOF10: 3
PAINLEVEL_OUTOF10: 5

## 2022-09-21 ASSESSMENT — PAIN DESCRIPTION - ORIENTATION: ORIENTATION: LOWER

## 2022-09-21 ASSESSMENT — PAIN DESCRIPTION - DESCRIPTORS
DESCRIPTORS: CRAMPING
DESCRIPTORS: CRAMPING

## 2022-09-21 ASSESSMENT — PAIN DESCRIPTION - RADICULAR PAIN: RADICULAR_PAIN: ABSENT

## 2022-09-21 NOTE — PROGRESS NOTES
Pt up to shower and tolerating well. No complaints of dizziness or lightheadedness. Complete linen change.

## 2022-09-21 NOTE — PLAN OF CARE
Problem: Postpartum  Goal: Experiences normal postpartum course  Description:  Postpartum OB-Pregnancy care plan goal which identifies if the mother is experiencing a normal postpartum course  Outcome: Progressing  Goal: Appropriate maternal -  bonding  Description:  Postpartum OB-Pregnancy care plan goal which identifies if the mother and  are bonding appropriately  Outcome: Progressing  Goal: Establishment of infant feeding pattern  Description:  Postpartum OB-Pregnancy care plan goal which identifies if the mother is establishing a feeding pattern with their   Outcome: Progressing     Problem: Pain  Goal: Verbalizes/displays adequate comfort level or baseline comfort level  Outcome: Progressing     Problem: Vaginal Birth or  Section  Goal: Fetal and maternal status remain reassuring during the birth process  Description:  Birth OB-Pregnancy care plan goal which identifies if the fetal and maternal status remain reassuring during the birth process  Outcome: Completed

## 2022-09-21 NOTE — PROGRESS NOTES
Hearing screening results were discussed with parent. Questions answered. Brochure given to parent. Advised to monitor developmental milestones and contact physician for any concerns.    Electronically signed by Hipolito Rhodes on 9/21/2022 at 8:49 AM

## 2022-09-21 NOTE — PROGRESS NOTES
Department of Obstetrics and Gynecology  Labor and Delivery  Attending Post Partum Progress Note      SUBJECTIVE:  Doing well with no complaints  OBJECTIVE:      Vitals:  /69   Pulse 50   Temp 98.7 °F (37.1 °C) (Oral)   Resp 16   LMP 01/27/2022   SpO2 98%   Breastfeeding Unknown     ABDOMEN:  Soft, well contracted uterus   Lochia: Normal  No calf tenderness    DATA:    CBC:    Lab Results   Component Value Date/Time    WBC 8.6 09/20/2022 09:50 AM    RBC 3.18 09/20/2022 09:50 AM    HGB 10.5 09/21/2022 04:50 AM    HCT 30.0 09/21/2022 04:50 AM    MCV 96.9 09/20/2022 09:50 AM    RDW 13.2 09/20/2022 09:50 AM     09/20/2022 09:50 AM       ASSESSMENT & PLAN:      PPD # 1    Continue current care.   Discharge home tomorrow

## 2022-09-21 NOTE — PROGRESS NOTES
Assumed care of pt at this time. Plan of care discussed. Pt verbalized understanding. No other concerns expressed at this time. Pt wanted to sleep. Call light within reach.

## 2022-09-21 NOTE — PROGRESS NOTES
Skin to skin stimulation initiated between mother and baby. [place] is pink and alert with regular respirations. Patient instructed on safe skin to skin care practices with proper positioning of baby and assurance of unobstructed airway; verbalizes understanding.

## 2022-09-21 NOTE — PROGRESS NOTES
Assuming care of pt at this time for over night shift. Report received from previous RN. Pt resting comfortably in bed with FOB supportive at bedside. Denies any pain or needs at this time. Call light in reach.

## 2022-09-21 NOTE — ANESTHESIA POSTPROCEDURE EVALUATION
Department of Anesthesiology  Postprocedure Note    Patient: Kwasi Mccarthy  MRN: [id number]  YOB: 2000  Date of evaluation: 9/21/2022      Procedure Summary     Date: 09/20/22 Room / Location:     Anesthesia Start: 6962 Anesthesia Stop: 1941    Procedure: Labor Analgesia Diagnosis:     Scheduled Providers:  Responsible Provider:     Anesthesia Type: epidural ASA Status: 3          Anesthesia Type: No value filed.     Ned Phase I:      Ned Phase II:        Anesthesia Post Evaluation    Patient location during evaluation: bedside  Patient participation: complete - patient participated  Level of consciousness: awake  Pain score: 0  Airway patency: patent  Nausea & Vomiting: no nausea and no vomiting  Complications: no  Cardiovascular status: hemodynamically stable  Respiratory status: acceptable  Hydration status: euvolemic

## 2022-09-22 VITALS
OXYGEN SATURATION: 99 % | DIASTOLIC BLOOD PRESSURE: 77 MMHG | TEMPERATURE: 97.6 F | SYSTOLIC BLOOD PRESSURE: 124 MMHG | RESPIRATION RATE: 16 BRPM | HEART RATE: 45 BPM

## 2022-09-22 PROCEDURE — 6370000000 HC RX 637 (ALT 250 FOR IP): Performed by: OBSTETRICS & GYNECOLOGY

## 2022-09-22 RX ORDER — IBUPROFEN 800 MG/1
800 TABLET ORAL EVERY 8 HOURS PRN
Qty: 120 TABLET | Refills: 0 | Status: SHIPPED | OUTPATIENT
Start: 2022-09-22

## 2022-09-22 RX ADMIN — ACETAMINOPHEN 1000 MG: 500 TABLET ORAL at 08:32

## 2022-09-22 RX ADMIN — DOCUSATE SODIUM 100 MG: 100 CAPSULE, LIQUID FILLED ORAL at 08:37

## 2022-09-22 ASSESSMENT — PAIN DESCRIPTION - DESCRIPTORS: DESCRIPTORS: CRAMPING

## 2022-09-22 ASSESSMENT — PAIN SCALES - GENERAL: PAINLEVEL_OUTOF10: 5

## 2022-09-22 ASSESSMENT — PAIN DESCRIPTION - LOCATION: LOCATION: ABDOMEN

## 2022-09-22 NOTE — PROGRESS NOTES
Discharge instructions discussed. Patient verbalizes understanding. No questions or concerns at this time.

## 2022-09-22 NOTE — DISCHARGE INSTRUCTIONS
Tub bath in 6 weeks  You may take a shower  Driving 2-3 weeks  Sexual activity 6 weeks  Work/School  6 weeks  Walking  As tolerated  Stairs as tolerated  Lifting no heavy lifting Tub bath in 6 weeks    Follow-up with MD in 6 weeks.

## 2022-09-22 NOTE — DISCHARGE SUMMARY
Obstetrical Discharge Form         Patients Name  Gilbert Wan    Gestational Age:  43w4d    Antepartum complications: H/O stroke in third trimester, non compliant    Date of Delivery:   2022       7:41 PM      Type of Delivery:   Vaginal, Spontaneous [250]   Rupture Date/time:    2022      5:28 PM     Presentation:    Vertex [1]   Position:                      Anesthesia:    Epidural [254]     Feeding method:         Delivered By:   Alen Bark R     Baby:       Information for the patient's :  Bernardino Romp Girl Rhett Garza [[id number]]        Intrapartum complications: None  Postpartum complications: none  Discharge Date:   2022  Discharge Condition: Stable  Disposition:   Home    Plan:   Follow up    in 6 weeks

## 2022-09-22 NOTE — PROGRESS NOTES
Pt does not wish to be interrupted/ have vitals taken in order to promote rest. Pt requested baby be sent to the nursery until she calls out or comes and gets the baby from the nursey.

## 2022-09-22 NOTE — PROGRESS NOTES
Spoke with  Naty Castellnao. Stated everything was good to go for patient to go home and will be putting in her note.

## 2022-09-22 NOTE — CARE COORDINATION
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 Sw provided and reviewed PPD counseling resources and maternity resource packet ([employer] [facility]), she accepted information but declined having sw make any counseling or maternal monitoring appointments prior to her discharge, says she feels she will have enough help and support from her mother and her older sister and from baby's paternal grandmother, nursing staff notified. Electronically signed by JEFFREY Gudino on 9/22/2022 at 12:53 PM

## 2022-11-05 ENCOUNTER — APPOINTMENT (OUTPATIENT)
Dept: GENERAL RADIOLOGY | Age: 22
End: 2022-11-05
Payer: COMMERCIAL

## 2022-11-05 ENCOUNTER — APPOINTMENT (OUTPATIENT)
Dept: CT IMAGING | Age: 22
End: 2022-11-05
Payer: COMMERCIAL

## 2022-11-05 ENCOUNTER — HOSPITAL ENCOUNTER (EMERGENCY)
Age: 22
Discharge: LEFT BEFORE DISCHARGE | End: 2022-11-05
Attending: EMERGENCY MEDICINE
Payer: COMMERCIAL

## 2022-11-05 VITALS
TEMPERATURE: 98.5 F | HEIGHT: 63 IN | DIASTOLIC BLOOD PRESSURE: 77 MMHG | SYSTOLIC BLOOD PRESSURE: 109 MMHG | BODY MASS INDEX: 20.38 KG/M2 | WEIGHT: 115 LBS | HEART RATE: 101 BPM | OXYGEN SATURATION: 99 % | RESPIRATION RATE: 18 BRPM

## 2022-11-05 DIAGNOSIS — R93.0 ABNORMAL CT OF THE HEAD: ICD-10-CM

## 2022-11-05 DIAGNOSIS — R51.9 NONINTRACTABLE HEADACHE, UNSPECIFIED CHRONICITY PATTERN, UNSPECIFIED HEADACHE TYPE: Primary | ICD-10-CM

## 2022-11-05 LAB
ALBUMIN SERPL-MCNC: 4.6 G/DL (ref 3.5–5.2)
ALP BLD-CCNC: 58 U/L (ref 35–104)
ALT SERPL-CCNC: 15 U/L (ref 0–32)
AMPHETAMINE SCREEN, URINE: NOT DETECTED
ANION GAP SERPL CALCULATED.3IONS-SCNC: 15 MMOL/L (ref 7–16)
AST SERPL-CCNC: 21 U/L (ref 0–31)
BACTERIA: ABNORMAL /HPF
BARBITURATE SCREEN URINE: NOT DETECTED
BASOPHILS ABSOLUTE: 0.04 E9/L (ref 0–0.2)
BASOPHILS RELATIVE PERCENT: 0.6 % (ref 0–2)
BENZODIAZEPINE SCREEN, URINE: NOT DETECTED
BILIRUB SERPL-MCNC: 0.5 MG/DL (ref 0–1.2)
BILIRUBIN URINE: NEGATIVE
BLOOD, URINE: NEGATIVE
BUN BLDV-MCNC: 5 MG/DL (ref 6–20)
CALCIUM SERPL-MCNC: 9.2 MG/DL (ref 8.6–10.2)
CANNABINOID SCREEN URINE: POSITIVE
CHLORIDE BLD-SCNC: 102 MMOL/L (ref 98–107)
CLARITY: NORMAL
CO2: 22 MMOL/L (ref 22–29)
COCAINE METABOLITE SCREEN URINE: NOT DETECTED
COLOR: YELLOW
CREAT SERPL-MCNC: 0.5 MG/DL (ref 0.5–1)
EOSINOPHILS ABSOLUTE: 0.06 E9/L (ref 0.05–0.5)
EOSINOPHILS RELATIVE PERCENT: 0.9 % (ref 0–6)
EPITHELIAL CELLS, UA: ABNORMAL /HPF
FENTANYL SCREEN, URINE: NOT DETECTED
GFR SERPL CREATININE-BSD FRML MDRD: >60 ML/MIN/1.73
GLUCOSE BLD-MCNC: 99 MG/DL (ref 74–99)
GLUCOSE URINE: NEGATIVE MG/DL
HCG, URINE, POC: NEGATIVE
HCT VFR BLD CALC: 40.9 % (ref 34–48)
HEMOGLOBIN: 13.9 G/DL (ref 11.5–15.5)
IMMATURE GRANULOCYTES #: 0.03 E9/L
IMMATURE GRANULOCYTES %: 0.5 % (ref 0–5)
KETONES, URINE: NEGATIVE MG/DL
LEUKOCYTE ESTERASE, URINE: NEGATIVE
LYMPHOCYTES ABSOLUTE: 1.64 E9/L (ref 1.5–4)
LYMPHOCYTES RELATIVE PERCENT: 24.9 % (ref 20–42)
Lab: ABNORMAL
Lab: NORMAL
MCH RBC QN AUTO: 32 PG (ref 26–35)
MCHC RBC AUTO-ENTMCNC: 34 % (ref 32–34.5)
MCV RBC AUTO: 94 FL (ref 80–99.9)
METHADONE SCREEN, URINE: NOT DETECTED
MONOCYTES ABSOLUTE: 0.36 E9/L (ref 0.1–0.95)
MONOCYTES RELATIVE PERCENT: 5.5 % (ref 2–12)
MUCUS: PRESENT /LPF
NEGATIVE QC PASS/FAIL: NORMAL
NEUTROPHILS ABSOLUTE: 4.45 E9/L (ref 1.8–7.3)
NEUTROPHILS RELATIVE PERCENT: 67.6 % (ref 43–80)
NITRITE, URINE: NEGATIVE
OPIATE SCREEN URINE: NOT DETECTED
OXYCODONE URINE: NOT DETECTED
PDW BLD-RTO: 11.8 FL (ref 11.5–15)
PH UA: 7 (ref 5–9)
PHENCYCLIDINE SCREEN URINE: NOT DETECTED
PLATELET # BLD: 279 E9/L (ref 130–450)
PMV BLD AUTO: 10 FL (ref 7–12)
POSITIVE QC PASS/FAIL: NORMAL
POTASSIUM SERPL-SCNC: 3.9 MMOL/L (ref 3.5–5)
PROTEIN UA: NEGATIVE MG/DL
RBC # BLD: 4.35 E12/L (ref 3.5–5.5)
RBC UA: ABNORMAL /HPF (ref 0–2)
SODIUM BLD-SCNC: 139 MMOL/L (ref 132–146)
SPECIFIC GRAVITY UA: 1.01 (ref 1–1.03)
TOTAL PROTEIN: 7.2 G/DL (ref 6.4–8.3)
TROPONIN, HIGH SENSITIVITY: <6 NG/L (ref 0–9)
UROBILINOGEN, URINE: 0.2 E.U./DL
WBC # BLD: 6.6 E9/L (ref 4.5–11.5)
WBC UA: ABNORMAL /HPF (ref 0–5)

## 2022-11-05 PROCEDURE — 99285 EMERGENCY DEPT VISIT HI MDM: CPT | Performed by: STUDENT IN AN ORGANIZED HEALTH CARE EDUCATION/TRAINING PROGRAM

## 2022-11-05 PROCEDURE — 80143 DRUG ASSAY ACETAMINOPHEN: CPT

## 2022-11-05 PROCEDURE — 70450 CT HEAD/BRAIN W/O DYE: CPT

## 2022-11-05 PROCEDURE — 2580000003 HC RX 258: Performed by: EMERGENCY MEDICINE

## 2022-11-05 PROCEDURE — 82077 ASSAY SPEC XCP UR&BREATH IA: CPT

## 2022-11-05 PROCEDURE — 80053 COMPREHEN METABOLIC PANEL: CPT

## 2022-11-05 PROCEDURE — 36415 COLL VENOUS BLD VENIPUNCTURE: CPT

## 2022-11-05 PROCEDURE — 85025 COMPLETE CBC W/AUTO DIFF WBC: CPT

## 2022-11-05 PROCEDURE — 93005 ELECTROCARDIOGRAM TRACING: CPT | Performed by: NURSE PRACTITIONER

## 2022-11-05 PROCEDURE — 70496 CT ANGIOGRAPHY HEAD: CPT

## 2022-11-05 PROCEDURE — 84484 ASSAY OF TROPONIN QUANT: CPT

## 2022-11-05 PROCEDURE — 71046 X-RAY EXAM CHEST 2 VIEWS: CPT

## 2022-11-05 PROCEDURE — 80179 DRUG ASSAY SALICYLATE: CPT

## 2022-11-05 PROCEDURE — 81001 URINALYSIS AUTO W/SCOPE: CPT

## 2022-11-05 PROCEDURE — 6360000004 HC RX CONTRAST MEDICATION: Performed by: RADIOLOGY

## 2022-11-05 PROCEDURE — 70498 CT ANGIOGRAPHY NECK: CPT

## 2022-11-05 PROCEDURE — 80307 DRUG TEST PRSMV CHEM ANLYZR: CPT

## 2022-11-05 RX ORDER — ASPIRIN 81 MG/1
160 TABLET, CHEWABLE ORAL DAILY
Qty: 30 TABLET | Refills: 0 | Status: SHIPPED | OUTPATIENT
Start: 2022-11-05 | End: 2022-11-10 | Stop reason: SDUPTHER

## 2022-11-05 RX ORDER — CLOPIDOGREL BISULFATE 75 MG/1
75 TABLET ORAL ONCE
Status: DISCONTINUED | OUTPATIENT
Start: 2022-11-05 | End: 2022-11-06 | Stop reason: HOSPADM

## 2022-11-05 RX ORDER — 0.9 % SODIUM CHLORIDE 0.9 %
1000 INTRAVENOUS SOLUTION INTRAVENOUS ONCE
Status: COMPLETED | OUTPATIENT
Start: 2022-11-05 | End: 2022-11-05

## 2022-11-05 RX ADMIN — SODIUM CHLORIDE 1000 ML: 9 INJECTION, SOLUTION INTRAVENOUS at 19:06

## 2022-11-05 RX ADMIN — IOPAMIDOL 70 ML: 755 INJECTION, SOLUTION INTRAVENOUS at 18:55

## 2022-11-05 NOTE — ED PROVIDER NOTES
Patient is a 62-year-old female who presents to the emergency department with complaint of feeling \"weird\". Patient states she woke this morning has been feeling shaky, with persistent headache to the right side of her head. Patient denies any neurodeficits, any urinary incontinence or frequency. Patient denies any head trauma. Patient denies any vomiting however endorses nausea. Patient denies any urinary or other GI symptoms. Patient states that she had a stroke during pregnancy a couple months ago, she was seen at [facility] clinic. Patient states that she had a headache during that stroke, and this headache feels similar. Patient is currently prescribed aspirin. Review of Systems   Constitutional:  Negative for chills and fever. HENT:  Negative for congestion, rhinorrhea, trouble swallowing and voice change. Eyes:  Negative for visual disturbance. Respiratory:  Negative for cough and shortness of breath. Cardiovascular:  Negative for chest pain and palpitations. Gastrointestinal:  Negative for abdominal pain, diarrhea, nausea and vomiting. Endocrine: Negative for polyuria. Genitourinary:  Negative for dysuria and urgency. Musculoskeletal:  Negative for arthralgias and myalgias. Skin:  Negative for rash and wound. Allergic/Immunologic: Negative for immunocompromised state. Neurological:  Positive for tremors, light-headedness and headaches. Negative for dizziness, syncope, speech difficulty, weakness and numbness. Patient with slowed speech pattern without any noted dysarthria or aphasia. She states this is her baseline. Psychiatric/Behavioral:  Negative for confusion. The patient is not nervous/anxious. Physical Exam  Vitals and nursing note reviewed. Constitutional:       General: She is awake. She is not in acute distress. Appearance: She is normal weight. She is not ill-appearing. HENT:      Head: Normocephalic and atraumatic.       Mouth/Throat: Mouth: Mucous membranes are moist.      Pharynx: Oropharynx is clear. Eyes:      Extraocular Movements: Extraocular movements intact. Pupils: Pupils are equal, round, and reactive to light. Cardiovascular:      Rate and Rhythm: Normal rate and regular rhythm. Pulses: Normal pulses. Radial pulses are 2+ on the right side and 2+ on the left side. Heart sounds: Normal heart sounds. Pulmonary:      Effort: Pulmonary effort is normal.      Breath sounds: Normal breath sounds. Abdominal:      Palpations: Abdomen is soft. Tenderness: There is no abdominal tenderness. Musculoskeletal:         General: Normal range of motion. Cervical back: Normal range of motion and neck supple. Right lower leg: No edema. Left lower leg: No edema. Skin:     General: Skin is warm and dry. Capillary Refill: Capillary refill takes less than 2 seconds. Neurological:      General: No focal deficit present. Mental Status: She is alert and oriented to person, place, and time. GCS: GCS eye subscore is 4. GCS verbal subscore is 5. GCS motor subscore is 6. Cranial Nerves: Cranial nerves 2-12 are intact. Sensory: Sensation is intact. Motor: Motor function is intact. Coordination: Coordination is intact. Comments: Neurologically intact. Patient is at baseline speech pattern. Patient with noted strength of 4/5 to the upper extremities, 5/5 to the lower extremities   Psychiatric:         Behavior: Behavior is cooperative. MDM  Number of Diagnoses or Management Options  Abnormal CT of the head  Nonintractable headache, unspecified chronicity pattern, unspecified headache type  Diagnosis management comments: Patient is a 63-year-old female who presents to the emergency department with complaint of a headache and feeling \"weird\". Patient feeling lightheaded. Patient presents awake, alert, following all commands, no apparent distress.   Vital signs were noted, patient is orthostatic positive, given IV fluids. Physical exam shows patient at her baseline with speech pattern, no other focal neurodeficits noted. Work-up labs show no significant findings, patient does have cannabis in her drug screen. Patient is negative pregnancy. EKG was reviewed. CT the head shows significant finding of change of 1 cm area of hypodense compared to previous in 2018. Patient states that she was treated at [address] [place] [address] for her stroke, care everywhere does not show records from [facility] at this time. Follow-up CTs including CTA were performed showing no significant findings. Neurology was consulted, patient was discussed, recommends MRI and MRV as well as Plavix. Patient would need to be admitted for neurology consult in the morning. this was discussed with patient, she is not agreeable to admission at this time. Patient states that her headache has resolved, wishes to go home and will follow-up as needed. Patient was discouraged from leaving with area of change, risks and benefits were discussed. Patient continues to want to go home. Patient signed AMA forms, given information to return to the emergency department anytime. Patient is agreeable. Amount and/or Complexity of Data Reviewed  Clinical lab tests: ordered and reviewed  Tests in the radiology section of CPT®: ordered and reviewed       ED Course as of 11/06/22 0035   Sat Nov 05, 2022 2018 CTA were reviewed. Neurology at [facility] will be consulted for abnormal CT head finding. [QC]   2019 Spoke with Dr. Mandy Sosa at [facility]. Patient was discussed. Scans were reviewed. He recommends Plavix 75 mg as well as MRI without contrast, MRV without contrast.  Also possible would be a CTV with contrast or reconstruction as able. MRIs will be nonstatin this evening. We will discussed with patient regarding admission.   Verified that neurology is available at [facility]. [QC]   2049 Spoke with patient regarding disposition and consult discussion. Patient states that she does not want to stay overnight, does not want to be seen tomorrow for MRI. She states that her headache is resolved and she wants to go home. Additionally patient states that she has not been taking her aspirin because she \"lost it\". I will refill her aspirin prescription and encouraged her to start taking it again. She can return to the emergency department any reason at any time. Patient to follow-up with her primary care doctor. [QC]      ED Course User Index  [QC] Susie Jose MD      ED Course as of 11/06/22 0042   Sat Nov 05, 2022 2018 CTA were reviewed. Neurology at [facility] will be consulted for abnormal CT head finding. [QC]   2019 Spoke with Dr. Courtney Green at [facility]. Patient was discussed. Scans were reviewed. He recommends Plavix 75 mg as well as MRI without contrast, MRV without contrast.  Also possible would be a CTV with contrast or reconstruction as able. MRIs will be nonstatin this evening. We will discussed with patient regarding admission. Verified that neurology is available at [facility]. [QC]   2049 Spoke with patient regarding disposition and consult discussion. Patient states that she does not want to stay overnight, does not want to be seen tomorrow for MRI. She states that her headache is resolved and she wants to go home. Additionally patient states that she has not been taking her aspirin because she \"lost it\". I will refill her aspirin prescription and encouraged her to start taking it again. She can return to the emergency department any reason at any time.   Patient to follow-up with her primary care doctor. [QC]      ED Course User Index  [QC] Susie Jose MD       --------------------------------------------- PAST HISTORY ---------------------------------------------  Past Medical History:  has a past medical history of 34 weeks gestation of pregnancy, 38 weeks gestation of pregnancy, Anxiety and depression, History of stroke in prior 3 months, Noncompliant pregnant patient in third trimester, PCB (post coital bleeding), and  (spontaneous vaginal delivery). Past Surgical History:  has a past surgical history that includes Tonsillectomy; Adenoidectomy; and Tympanostomy tube placement. Social History:  reports that she quit smoking about 9 months ago. She has never used smokeless tobacco. She reports that she does not drink alcohol and does not use drugs. Family History: Family history is unknown by patient. The patients home medications have been reviewed.     Allergies: Cefzil [cefprozil]    -------------------------------------------------- RESULTS -------------------------------------------------    Lab  Results for orders placed or performed during the hospital encounter of 22   Troponin   Result Value Ref Range    Troponin, High Sensitivity <6 0 - 9 ng/L   CBC with Auto Differential   Result Value Ref Range    WBC 6.6 4.5 - 11.5 E9/L    RBC 4.35 3.50 - 5.50 E12/L    Hemoglobin 13.9 11.5 - 15.5 g/dL    Hematocrit 40.9 34.0 - 48.0 %    MCV 94.0 80.0 - 99.9 fL    MCH 32.0 26.0 - 35.0 pg    MCHC 34.0 32.0 - 34.5 %    RDW 11.8 11.5 - 15.0 fL    Platelets 873 789 - 178 E9/L    MPV 10.0 7.0 - 12.0 fL    Neutrophils % 67.6 43.0 - 80.0 %    Immature Granulocytes % 0.5 0.0 - 5.0 %    Lymphocytes % 24.9 20.0 - 42.0 %    Monocytes % 5.5 2.0 - 12.0 %    Eosinophils % 0.9 0.0 - 6.0 %    Basophils % 0.6 0.0 - 2.0 %    Neutrophils Absolute 4.45 1.80 - 7.30 E9/L    Immature Granulocytes # 0.03 E9/L    Lymphocytes Absolute 1.64 1.50 - 4.00 E9/L    Monocytes Absolute 0.36 0.10 - 0.95 E9/L    Eosinophils Absolute 0.06 0.05 - 0.50 E9/L    Basophils Absolute 0.04 0.00 - 0.20 E9/L   Comprehensive Metabolic Panel   Result Value Ref Range    Sodium 139 132 - 146 mmol/L    Potassium 3.9 3.5 - 5.0 mmol/L    Chloride 102 98 - 107 mmol/L    CO2 22 22 - 29 mmol/L    Anion Gap 15 7 - 16 mmol/L    Glucose 99 74 - 99 mg/dL    BUN 5 (L) 6 - 20 mg/dL    Creatinine 0.5 0.5 - 1.0 mg/dL    Est, Glom Filt Rate >60 >=60 mL/min/1.73    Calcium 9.2 8.6 - 10.2 mg/dL    Total Protein 7.2 6.4 - 8.3 g/dL    Albumin 4.6 3.5 - 5.2 g/dL    Total Bilirubin 0.5 0.0 - 1.2 mg/dL    Alkaline Phosphatase 58 35 - 104 U/L    ALT 15 0 - 32 U/L    AST 21 0 - 31 U/L   Urinalysis   Result Value Ref Range    Color, UA Yellow Straw/Yellow    Clarity, UA SLCLOUDY Clear    Glucose, Ur Negative Negative mg/dL    Bilirubin Urine Negative Negative    Ketones, Urine Negative Negative mg/dL    Specific Gravity, UA 1.015 1.005 - 1.030    Blood, Urine Negative Negative    pH, UA 7.0 5.0 - 9.0    Protein, UA Negative Negative mg/dL    Urobilinogen, Urine 0.2 <2.0 E.U./dL    Nitrite, Urine Negative Negative    Leukocyte Esterase, Urine Negative Negative   Urine Drug Screen   Result Value Ref Range    Amphetamine Screen, Urine NOT DETECTED Negative <1000 ng/mL    Barbiturate Screen, Ur NOT DETECTED Negative < 200 ng/mL    Benzodiazepine Screen, Urine NOT DETECTED Negative < 200 ng/mL    Cannabinoid Scrn, Ur POSITIVE (A) Negative < 50ng/mL    Cocaine Metabolite Screen, Urine NOT DETECTED Negative < 300 ng/mL    Opiate Scrn, Ur NOT DETECTED Negative < 300ng/mL    PCP Screen, Urine NOT DETECTED Negative < 25 ng/mL    Methadone Screen, Urine NOT DETECTED Negative <300 ng/mL    Oxycodone Urine NOT DETECTED Negative <100 ng/mL    FENTANYL SCREEN, URINE NOT DETECTED Negative <1 ng/mL    Drug Screen Comment: see below    Serum Drug Screen   Result Value Ref Range    Ethanol Lvl <10 mg/dL    Acetaminophen Level <5.0 (L) 10.0 - 50.6 mcg/mL    Salicylate, Serum <3.9 0.0 - 30.0 mg/dL   Microscopic Urinalysis   Result Value Ref Range    Mucus, UA Present (A) None Seen /LPF    WBC, UA NONE 0 - 5 /HPF RBC, UA NONE 0 - 2 /HPF    Epithelial Cells, UA MANY /HPF    Bacteria, UA MODERATE (A) None Seen /HPF   POC Pregnancy Urine   Result Value Ref Range    HCG, Urine, POC Negative Negative    Lot Number [id number]     Positive QC Pass/Fail Pass     Negative QC Pass/Fail Pass    EKG 12 Lead   Result Value Ref Range    Ventricular Rate 77 BPM    Atrial Rate 77 BPM    P-R Interval 122 ms    QRS Duration 76 ms    Q-T Interval 402 ms    QTc Calculation (Bazett) 454 ms    P Axis 64 degrees    R Axis 72 degrees    T Axis 69 degrees       Radiology  CTA NECK W CONTRAST   Final Result   CTA Neck:      No hemodynamically significant stenosis or dissection of the cervical   internal carotid arteries. No high-grade stenosis or dissection of the cervical vertebral arteries. CTA Head:      No large vessel arterial occlusion or high-grade stenosis. CTA HEAD W CONTRAST   Final Result   CTA Neck:      No hemodynamically significant stenosis or dissection of the cervical   internal carotid arteries. No high-grade stenosis or dissection of the cervical vertebral arteries. CTA Head:      No large vessel arterial occlusion or high-grade stenosis. XR CHEST (2 VW)   Final Result   No acute process. CT HEAD WO CONTRAST   Final Result   1. Approximately 1 cm ill-defined focus of low attenuation within the   subcortical white matter of the left frontal lobe within the centrum   semiovale. The etiology is uncertain. This finding was not present on the   patient's prior study of 05/21/2018. Dedicated MRI of the brain is   recommended for further evaluation. 2. There is no acute intracranial hemorrhage. EKG:  This EKG is signed and interpreted by me.     Rate: 77  Rhythm: Sinus  Interpretation: no acute changes  Comparison: stable as compared to patient's most recent EKG    ------------------------- NURSING NOTES AND VITALS REVIEWED ---------------------------  Date / Time Roomed: 11/5/2022  5:22 PM  ED Bed Assignment:  VINAY/VINAY    The nursing notes within the ED encounter and vital signs as below have been reviewed. Patient Vitals for the past 24 hrs:   BP Temp Temp src Pulse Resp SpO2 Height Weight   11/05/22 1827 -- -- -- -- 18 99 % -- --   11/05/22 1716 109/77 98.5 °F (36.9 °C) Infrared (!) 101 18 100 % 5' 3\" (1.6 m) 115 lb (52.2 kg)       Oxygen Saturation Interpretation: Normal      ------------------------------------------ PROGRESS NOTES ------------------------------------------  Re-evaluation(s):  Patients symptoms are improving  Repeat physical examination is not changed    I have spoken with the patient and discussed todays results, in addition to providing specific details for the plan of care and counseling regarding the diagnosis and prognosis. Their questions are answered at this time and they are agreeable with the plan.    --------------------------------- ADDITIONAL PROVIDER NOTES ---------------------------------    This patient's ED course included: a personal history and physicial examination, multiple bedside re-evaluations, and IV medications    This patient has remained hemodynamically stable, improved, and been closely monitored during their ED course. Please note that the withdrawal or failure to initiate urgent interventions for this patient would likely result in a life threatening deterioration or permanent disability. Clinical Impression  1. Nonintractable headache, unspecified chronicity pattern, unspecified headache type    2. Abnormal CT of the head        Disposition  Patient's disposition: Other Disposition: Left AMA  Patient's condition is stable    11/6/22, 12:42 AM EDT.     This note is prepared by Sedrick Castellanos MD -PGY- 3             Sedrick Castellanos MD  Resident  11/06/22 5464

## 2022-11-06 LAB
ACETAMINOPHEN LEVEL: <5 MCG/ML (ref 10–30)
EKG ATRIAL RATE: 77 BPM
EKG P AXIS: 64 DEGREES
EKG P-R INTERVAL: 122 MS
EKG Q-T INTERVAL: 402 MS
EKG QRS DURATION: 76 MS
EKG QTC CALCULATION (BAZETT): 454 MS
EKG R AXIS: 72 DEGREES
EKG T AXIS: 69 DEGREES
EKG VENTRICULAR RATE: 77 BPM
ETHANOL: <10 MG/DL (ref 0–0.08)
SALICYLATE, SERUM: <0.3 MG/DL (ref 0–30)
TRICYCLIC ANTIDEPRESSANTS SCREEN SERUM: NEGATIVE NG/ML

## 2022-11-06 ASSESSMENT — ENCOUNTER SYMPTOMS
RHINORRHEA: 0
NAUSEA: 0
SHORTNESS OF BREATH: 0
COUGH: 0
DIARRHEA: 0
TROUBLE SWALLOWING: 0
VOMITING: 0
ABDOMINAL PAIN: 0
VOICE CHANGE: 0

## 2022-11-10 ENCOUNTER — OFFICE VISIT (OUTPATIENT)
Dept: FAMILY MEDICINE CLINIC | Age: 22
End: 2022-11-10
Payer: COMMERCIAL

## 2022-11-10 VITALS
RESPIRATION RATE: 16 BRPM | OXYGEN SATURATION: 98 % | HEIGHT: 63 IN | SYSTOLIC BLOOD PRESSURE: 92 MMHG | DIASTOLIC BLOOD PRESSURE: 60 MMHG | HEART RATE: 105 BPM | TEMPERATURE: 98.9 F | WEIGHT: 116.4 LBS | BODY MASS INDEX: 20.62 KG/M2

## 2022-11-10 DIAGNOSIS — R93.0 ABNORMAL CT OF THE HEAD: Primary | ICD-10-CM

## 2022-11-10 DIAGNOSIS — I63.9 CEREBROVASCULAR ACCIDENT (CVA), UNSPECIFIED MECHANISM (HCC): ICD-10-CM

## 2022-11-10 PROCEDURE — 1036F TOBACCO NON-USER: CPT | Performed by: NURSE PRACTITIONER

## 2022-11-10 PROCEDURE — 99214 OFFICE O/P EST MOD 30 MIN: CPT | Performed by: NURSE PRACTITIONER

## 2022-11-10 PROCEDURE — G8484 FLU IMMUNIZE NO ADMIN: HCPCS | Performed by: NURSE PRACTITIONER

## 2022-11-10 PROCEDURE — G8420 CALC BMI NORM PARAMETERS: HCPCS | Performed by: NURSE PRACTITIONER

## 2022-11-10 PROCEDURE — G8427 DOCREV CUR MEDS BY ELIG CLIN: HCPCS | Performed by: NURSE PRACTITIONER

## 2022-11-10 RX ORDER — CLOPIDOGREL BISULFATE 75 MG/1
75 TABLET ORAL DAILY
Qty: 30 TABLET | Refills: 2 | Status: SHIPPED | OUTPATIENT
Start: 2022-11-10

## 2022-11-10 RX ORDER — ASPIRIN 81 MG/1
160 TABLET, CHEWABLE ORAL DAILY
Qty: 30 TABLET | Refills: 2 | Status: SHIPPED | OUTPATIENT
Start: 2022-11-10

## 2022-11-10 RX ORDER — PNV NO.95/FERROUS FUM/FOLIC AC 28MG-0.8MG
1 TABLET ORAL DAILY
Qty: 30 TABLET | Refills: 5 | Status: SHIPPED | OUTPATIENT
Start: 2022-11-10

## 2022-11-10 SDOH — ECONOMIC STABILITY: FOOD INSECURITY: WITHIN THE PAST 12 MONTHS, THE FOOD YOU BOUGHT JUST DIDN'T LAST AND YOU DIDN'T HAVE MONEY TO GET MORE.: NEVER TRUE

## 2022-11-10 SDOH — ECONOMIC STABILITY: FOOD INSECURITY: WITHIN THE PAST 12 MONTHS, YOU WORRIED THAT YOUR FOOD WOULD RUN OUT BEFORE YOU GOT MONEY TO BUY MORE.: NEVER TRUE

## 2022-11-10 ASSESSMENT — PATIENT HEALTH QUESTIONNAIRE - PHQ9
SUM OF ALL RESPONSES TO PHQ QUESTIONS 1-9: 2
SUM OF ALL RESPONSES TO PHQ QUESTIONS 1-9: 4
7. TROUBLE CONCENTRATING ON THINGS, SUCH AS READING THE NEWSPAPER OR WATCHING TELEVISION: 0
SUM OF ALL RESPONSES TO PHQ QUESTIONS 1-9: 4
1. LITTLE INTEREST OR PLEASURE IN DOING THINGS: 1
8. MOVING OR SPEAKING SO SLOWLY THAT OTHER PEOPLE COULD HAVE NOTICED. OR THE OPPOSITE, BEING SO FIGETY OR RESTLESS THAT YOU HAVE BEEN MOVING AROUND A LOT MORE THAN USUAL: 0
4. FEELING TIRED OR HAVING LITTLE ENERGY: 1
6. FEELING BAD ABOUT YOURSELF - OR THAT YOU ARE A FAILURE OR HAVE LET YOURSELF OR YOUR FAMILY DOWN: 0
9. THOUGHTS THAT YOU WOULD BE BETTER OFF DEAD, OR OF HURTING YOURSELF: 0
SUM OF ALL RESPONSES TO PHQ QUESTIONS 1-9: 4
3. TROUBLE FALLING OR STAYING ASLEEP: 2
2. FEELING DOWN, DEPRESSED OR HOPELESS: 1
2. FEELING DOWN, DEPRESSED OR HOPELESS: 1
5. POOR APPETITE OR OVEREATING: 0
10. IF YOU CHECKED OFF ANY PROBLEMS, HOW DIFFICULT HAVE THESE PROBLEMS MADE IT FOR YOU TO DO YOUR WORK, TAKE CARE OF THINGS AT HOME, OR GET ALONG WITH OTHER PEOPLE: 0
SUM OF ALL RESPONSES TO PHQ QUESTIONS 1-9: 2
SUM OF ALL RESPONSES TO PHQ QUESTIONS 1-9: 4
SUM OF ALL RESPONSES TO PHQ QUESTIONS 1-9: 2
SUM OF ALL RESPONSES TO PHQ QUESTIONS 1-9: 2
SUM OF ALL RESPONSES TO PHQ9 QUESTIONS 1 & 2: 2

## 2022-11-10 ASSESSMENT — ENCOUNTER SYMPTOMS
COUGH: 0
BACK PAIN: 0
VOMITING: 0
NAUSEA: 0
SHORTNESS OF BREATH: 0
DIARRHEA: 0
CONSTIPATION: 0
WHEEZING: 0

## 2022-11-10 ASSESSMENT — SOCIAL DETERMINANTS OF HEALTH (SDOH): HOW HARD IS IT FOR YOU TO PAY FOR THE VERY BASICS LIKE FOOD, HOUSING, MEDICAL CARE, AND HEATING?: NOT HARD AT ALL

## 2022-11-10 NOTE — PROGRESS NOTES
Stephen Beaulieu (:  2000) is a 23 y.o. female,Established patient, here for evaluation of the following chief complaint(s):  ED Follow-up (Had stroke in Aug and they also found something else on brain but unsure what it is. Still has blood clot on brain. Needs referral to closer cardiologist and neurologist. Has Dr's at [facility], but too far for them since no car. )         ASSESSMENT/PLAN:  1. Abnormal CT of the head  -     ORLY Sharif, Neurology, [place]  2. Cerebrovascular accident (CVA), unspecified mechanism ([facility] [address]  -     aspirin 81 MG chewable tablet; Take 2 tablets by mouth daily, Disp-30 tablet, R-2Normal  -     ORLY Curiel, Neurology, [place]  -     clopidogrel (PLAVIX) 75 MG tablet; Take 1 tablet by mouth daily, Disp-30 tablet, R-2Normal  -  Reviewed side effects of medication and patient verbalizes understanding.   -  Advised to call back directly if there are further questions, or if these symptoms fail to improve as anticipated or worsen. -  patient is not currently on birth control but states she is not sexually active  -  patient and mother aware how important this medication regimen is and verbalized understanding  -  patient also instructed to return to ED if any of her symptoms return, patient and mother verbalize understanding  -  will get copy of echo and BW and heart rate done at [facility]    Discussed case with Dr. Lilliana Argueta. Return in about 4 weeks (around 2022), or if symptoms worsen or fail to improve. Subjective   SUBJECTIVE/OBJECTIVE:  HPI  Here today for f/u from ED on . Patient has history of CVA during pregnancy in August. Was seen at [facility] and there is no information in care everywhere. Went to ED on  with similar symptoms. Abnormal CT head on  and neuro wanted to admit patient for MRI, MRV and start plavix. Patient refused and left AMA.  She is here today with her mom for a referral to local neuro since she cannot make it to [place] due to transportation. Patient denies any headache or other symptoms she was experiencing on 11/5. Reports that she had an echo with a bubble at [facility] and a lot of lab work but unsure if they tested for MTHFR. Patient does speak slowly but her mother said this is her baseline. BP 92/60   Pulse (!) 105   Temp 98.9 °F (37.2 °C)   Resp 16   Ht 5' 3\" (1.6 m)   Wt 116 lb 6.4 oz (52.8 kg)   LMP 01/27/2022   SpO2 98%   BMI 20.62 kg/m²     Review of Systems   Constitutional:  Negative for activity change, appetite change, chills, diaphoresis, fever and unexpected weight change. Respiratory:  Negative for cough, shortness of breath and wheezing. Cardiovascular:  Negative for chest pain and palpitations. Gastrointestinal:  Negative for constipation, diarrhea, nausea and vomiting. Genitourinary:  Negative for difficulty urinating. Musculoskeletal:  Negative for arthralgias, back pain and neck pain. Neurological:  Negative for dizziness, seizures, speech difficulty, weakness, numbness and headaches. Psychiatric/Behavioral:  Negative for dysphoric mood and sleep disturbance. The patient is not nervous/anxious. Objective   Physical Exam  Constitutional:       Appearance: She is well-developed. HENT:      Head: Normocephalic and atraumatic. Neck:      Thyroid: No thyromegaly. Trachea: No tracheal deviation. Cardiovascular:      Rate and Rhythm: Normal rate and regular rhythm. Heart sounds: No murmur heard. Pulmonary:      Effort: Pulmonary effort is normal. No respiratory distress. Breath sounds: Normal breath sounds. Abdominal:      General: Bowel sounds are normal.      Palpations: Abdomen is soft. Tenderness: There is no abdominal tenderness. Musculoskeletal:         General: Normal range of motion. Lymphadenopathy:      Cervical: No cervical adenopathy. Skin:     General: Skin is warm and dry. Neurological:      General: No focal deficit present. Mental Status: She is alert and oriented to person, place, and time. Cranial Nerves: No cranial nerve deficit. Sensory: No sensory deficit. Motor: No weakness. Coordination: Coordination normal.      Gait: Gait normal.   Psychiatric:         Mood and Affect: Mood normal. Affect is flat. Behavior: Behavior normal.      Comments: Patient speaks slowly but told this is her baseline          On this date 11/10/2022 I have spent 33 minutes reviewing previous notes, test results and face to face with the patient discussing the diagnosis and importance of compliance with the treatment plan as well as documenting on the day of the visit. An electronic signature was used to authenticate this note.     --JL Kulkarni - CNP

## 2023-02-03 ENCOUNTER — OFFICE VISIT (OUTPATIENT)
Dept: NEUROLOGY | Age: 23
End: 2023-02-03
Payer: COMMERCIAL

## 2023-02-03 VITALS
SYSTOLIC BLOOD PRESSURE: 100 MMHG | WEIGHT: 115 LBS | BODY MASS INDEX: 20.38 KG/M2 | TEMPERATURE: 97.3 F | OXYGEN SATURATION: 99 % | HEIGHT: 63 IN | HEART RATE: 78 BPM | DIASTOLIC BLOOD PRESSURE: 65 MMHG

## 2023-02-03 DIAGNOSIS — I63.412 CEREBROVASCULAR ACCIDENT (CVA) DUE TO EMBOLISM OF LEFT MIDDLE CEREBRAL ARTERY (HCC): ICD-10-CM

## 2023-02-03 DIAGNOSIS — R51.9 CHRONIC DAILY HEADACHE: ICD-10-CM

## 2023-02-03 DIAGNOSIS — Z86.73 HISTORY OF STROKE IN PRIOR 3 MONTHS: Primary | ICD-10-CM

## 2023-02-03 DIAGNOSIS — G44.40 MEDICATION OVERUSE HEADACHE: ICD-10-CM

## 2023-02-03 DIAGNOSIS — I63.9 CEREBROVASCULAR ACCIDENT (CVA), UNSPECIFIED MECHANISM (HCC): ICD-10-CM

## 2023-02-03 PROCEDURE — G8420 CALC BMI NORM PARAMETERS: HCPCS | Performed by: NURSE PRACTITIONER

## 2023-02-03 PROCEDURE — 99204 OFFICE O/P NEW MOD 45 MIN: CPT | Performed by: NURSE PRACTITIONER

## 2023-02-03 PROCEDURE — G8484 FLU IMMUNIZE NO ADMIN: HCPCS | Performed by: NURSE PRACTITIONER

## 2023-02-03 PROCEDURE — G8427 DOCREV CUR MEDS BY ELIG CLIN: HCPCS | Performed by: NURSE PRACTITIONER

## 2023-02-03 PROCEDURE — 1036F TOBACCO NON-USER: CPT | Performed by: NURSE PRACTITIONER

## 2023-02-03 RX ORDER — CLOPIDOGREL BISULFATE 75 MG/1
75 TABLET ORAL DAILY
Qty: 30 TABLET | Refills: 2 | Status: SHIPPED | OUTPATIENT
Start: 2023-02-03

## 2023-02-03 RX ORDER — TOPIRAMATE 25 MG/1
50 TABLET ORAL NIGHTLY
Qty: 60 TABLET | Refills: 2 | Status: SHIPPED | OUTPATIENT
Start: 2023-02-03

## 2023-02-03 RX ORDER — ASPIRIN 81 MG/1
160 TABLET, CHEWABLE ORAL DAILY
Qty: 30 TABLET | Refills: 2 | Status: SHIPPED | OUTPATIENT
Start: 2023-02-03

## 2023-02-03 NOTE — TELEPHONE ENCOUNTER
Requested Prescriptions     Pending Prescriptions Disp Refills    clopidogrel (PLAVIX) 75 MG tablet 30 tablet 2     Sig: Take 1 tablet by mouth daily    aspirin 81 MG chewable tablet 30 tablet 2     Sig: Take 2 tablets by mouth daily       Next appt is Visit date not found  Last appt was 11/10/2022

## 2023-02-03 NOTE — PROGRESS NOTES
[address] [facility]. Tommy Miramontes M.D., F.A.C.P. Live Echols, DNP, APRN, ACNS-BC  Govind Villalba. Ana Scott, MSN, APRN-FNP-C  Kelli Pittman, MSN, APRN-FNP-C  JUAN MataAS, PANicoC  Isak Marie, MSN, APRN-FNP-C  [address], [facility]  [place], [address]  Phone: [phone]  Fax: [phone]       Bronson Lindsey is a 26 y.o. right handed female     Patient referred for stroke    She is accompanied by her mother today     Past Medical History:     Past Medical History:   Diagnosis Date    34 weeks gestation of pregnancy 2022    38 weeks gestation of pregnancy 2022    Anxiety and depression 3/8/2021    History of stroke in prior 3 months 2022    Noncompliant pregnant patient in third trimester 2022    PCB (post coital bleeding) 2022     (spontaneous vaginal delivery) 2022     No history of cardiac, liver, lung or kidney disease. No history of seizures. No history of connective tissue disorders or cancers. No history of exposures to toxins or chemicals. History of hits to the head: none  Injuries: none  MVAs: none    Past Surgical History:       Past Surgical History:   Procedure Laterality Date    ADENOIDECTOMY      TONSILLECTOMY      TYMPANOSTOMY TUBE PLACEMENT       Allergies:       Cefzil [cefprozil]    Medications:     Prior to Admission medications    Medication Sig Start Date End Date Taking?  Authorizing Provider   aspirin 81 MG chewable tablet Take 2 tablets by mouth daily 11/10/22   Liliane Seats, APRN - CNP   Prenatal Vit-Fe Fumarate-FA (PRENATAL VITAMINS) 28-0.8 MG TABS Take 1 tablet by mouth daily 11/10/22   Liliane Seats, APRN - CNP   clopidogrel (PLAVIX) 75 MG tablet Take 1 tablet by mouth daily 11/10/22   Liliane Seats, APRN - CNP   ibuprofen (ADVIL;MOTRIN) 800 MG tablet Take 1 tablet by mouth every 8 hours as needed for Pain  Patient not taking: Reported on 11/10/2022 9/22/22   Lacy Tracy MD Social History:       She reports that she quit smoking about a year ago. Her smoking use included cigarettes. She has never used smokeless tobacco. She reports that she does not drink alcohol and does not use drugs. She vapes daily. Review of Systems:     Sleep: not sleeping well intermittent through the night     No issues with chewing or swallowing  No chest pain or palpitations  No SOB  No vertigo, lightheadedness or loss of consciousness  No falls, tripping or stumbling  No incontinence of bowels or bladder  No itching or bruising appreciated  No numbness, tingling or focal arm/leg weakness    ROS is otherwise negative    Family History:     Family History   Family history unknown: Yes      History of Present Illness:     Patient referred for stroke. She is accompanied by her mother today. August 2022 patient was 36 weeks pregnant and presented to the ED with tingling in her right arm. At that time she denied any injury or fall. She had no other symptoms at that time. No testing was completed at that time and she was discharged home. Due to worsening paresthesias which was now in her face [facility]  She was evaluated with MRI brain and MRA head/neck which showed a subacute left MCA in the corona radiata. MRA head/neck did not show any significant stenosis or LVO. She was then transferred to [facility] in [place]. She was started on aspirin and statin. TTE was completed which showed a moderately dilated left atrium and suggested PFO. She was seen by cardiology and MITCH was completed which showed no evidence of large ASD or VSD but PFO cannot be ruled out. She was discharged home. She was seen by PCP who started patient on DAPT and continued statin. There is a concern for embolic stroke of undetermined source in the setting of pregnancy. She was counseled that she would require a Holter monitor for 14 days but did not complete that.   It was also recommended that she should be worked up for hyper coag. Patient also has complaints of intermittent headaches. She notes taking ibuprofen or Tylenol almost daily.     Objective:     Vitals:    02/03/23 1055   BP: 100/65   Site: Right Upper Arm   Pulse: 78   Temp: 97.3 °F (36.3 °C)   SpO2: 99%   Weight: 115 lb (52.2 kg)   Height: 5' 3\" (1.6 m)      General appearance: alert, appears stated age, cooperative and in no distress  Head: normocephalic, without obvious abnormality, atraumatic  Eyes: conjunctivae/corneas clear; no drainage  Neck: no carotid bruit, supple, symmetrical, trachea midline   Lungs: clear to auscultation bilaterally  Heart: regular rate and rhythm, S1, S2 normal, ? murmur  Abdomen: soft, non-tender; bowel sounds normal  Extremities: normal, atraumatic, no cyanosis or edema  Skin:  color, texture, turgor normal--no rashes or lesions      Mental Status: alert and oriented x 4    Appropriate attention/concentration  Intact fundus of knowledge  Repetition intact  Memories intact    Speech: no dysarthria, slow to speak but her baseline  Language: no aphasias---reading, writing, repetition, and object identification intact    Cranial Nerves:  I: smell    II: visual acuity     II: visual fields Full    II: pupils MAGDALENO   III,VII: ptosis None   III,IV,VI: extraocular muscles  EOMI without nystagmus   V: mastication Normal   V: facial light touch sensation  Normal   V,VII: corneal reflex     VII: facial muscle function - upper  Normal   VII: facial muscle function - lower Normal   VIII: hearing Normal   IX: soft palate elevation  Normal   IX,X: gag reflex    XI: trapezius strength  5/5   XI: sternocleidomastoid strength 5/5   XI: neck extension strength  5/5   XII: tongue strength  Normal     Motor:  5/5 throughout  Normal bulk and tone  No drift   No abnormal movements    Sensory:  LT and PP normal  Vibration normal    Coordination:   FN, FFM and RICARDO normal  HS normal    Gait:  Normal  Romberg's negative  Walks well tandem    DTR:   BUE 2+  BLE 3+    No Sprague's    No other pathological reflexes    Laboratory/Radiology:  ry/Radiology:     CBC with Differential:    Lab Results   Component Value Date/Time    WBC 6.6 11/05/2022 06:11 PM    RBC 4.35 11/05/2022 06:11 PM    HGB 13.9 11/05/2022 06:11 PM    HCT 40.9 11/05/2022 06:11 PM     11/05/2022 06:11 PM    MCV 94.0 11/05/2022 06:11 PM    MCH 32.0 11/05/2022 06:11 PM    MCHC 34.0 11/05/2022 06:11 PM    RDW 11.8 11/05/2022 06:11 PM    LYMPHOPCT 24.9 11/05/2022 06:11 PM    MONOPCT 5.5 11/05/2022 06:11 PM    BASOPCT 0.6 11/05/2022 06:11 PM    MONOSABS 0.36 11/05/2022 06:11 PM    LYMPHSABS 1.64 11/05/2022 06:11 PM    EOSABS 0.06 11/05/2022 06:11 PM    BASOSABS 0.04 11/05/2022 06:11 PM     CMP:    Lab Results   Component Value Date/Time     11/05/2022 06:11 PM    K 3.9 11/05/2022 06:11 PM    K 3.5 02/05/2022 09:37 AM     11/05/2022 06:11 PM    CO2 22 11/05/2022 06:11 PM    BUN 5 11/05/2022 06:11 PM    CREATININE 0.5 11/05/2022 06:11 PM    GFRAA >60 09/20/2022 09:50 AM    LABGLOM >60 11/05/2022 06:11 PM    GLUCOSE 99 11/05/2022 06:11 PM    PROT 7.2 11/05/2022 06:11 PM    LABALBU 4.6 11/05/2022 06:11 PM    CALCIUM 9.2 11/05/2022 06:11 PM    BILITOT 0.5 11/05/2022 06:11 PM    ALKPHOS 58 11/05/2022 06:11 PM    AST 21 11/05/2022 06:11 PM    ALT 15 11/05/2022 06:11 PM     Hepatic Function Panel:    Lab Results   Component Value Date/Time    ALKPHOS 58 11/05/2022 06:11 PM    ALT 15 11/05/2022 06:11 PM    AST 21 11/05/2022 06:11 PM    PROT 7.2 11/05/2022 06:11 PM    BILITOT 0.5 11/05/2022 06:11 PM    LABALBU 4.6 11/05/2022 06:11 PM     CTH: 1. Approximately 1 cm ill-defined focus of low attenuation within the  subcortical white matter of the left frontal lobe within the centrum  semiovale. The etiology is uncertain. This finding was not present on the  patient's prior study of 05/21/2018.   Dedicated MRI of the brain is  recommended for further evaluation. 2. There is no acute intracranial hemorrhage. CTA Neck:  No hemodynamically significant stenosis or dissection of the cervical  internal carotid arteries. No high-grade stenosis or dissection of the cervical vertebral arteries. CTA Head:  No large vessel arterial occlusion or high-grade stenosis. All labs and images were personally reviewed at the time of this visit    Assessment:     Embolic appearing stroke in the setting of pregnancy  --- lipid and A1c will be ordered  --- repeat MRI brain   --- will send for hypercoags  --- ? Arrhythmia during my exam will refer her to Cardio to be evaluated  --- due to possible clotting disorder will also send her to Hematology to be looked at  --- neuro exam was unremarkable   --- on aspirin and statin    Chronic daily headaches  --- > 15 headaches/monthhly  --- no preventative   --- abortive meds tried tylenol, ibuprofen    Medication overuse headache  --- advised pt to stop taking OTC pain relievers     Plan:     Continue DAPT and statin     Started topamax 25 mg daily for 7 days then 50 mg daily thereafter    MRI brain ordered    Zio patch applied today     Referral to Cardiology     Referral to Hematology    Lipid panel and A1c ordered    Hypercoag labs ordered    Follow up in 3 months    Call with any questions or concerns       Joey Gutierrez, JL - CNP  10:49 AM  2/3/2023    I spent 60 minutes with this patient obtaining the HPI and discussing the exam with greater than 50% of the time providing counseling and education on medications and other treatment plans. All questions were answered prior to leaving my office.

## 2023-03-08 ENCOUNTER — HOSPITAL ENCOUNTER (OUTPATIENT)
Dept: INFUSION THERAPY | Age: 23
Discharge: HOME | End: 2023-03-08
Payer: COMMERCIAL

## 2023-03-08 ENCOUNTER — HOSPITAL ENCOUNTER (OUTPATIENT)
Dept: MRI IMAGING | Age: 23
Discharge: HOME | End: 2023-03-10
Payer: COMMERCIAL

## 2023-03-08 ENCOUNTER — OFFICE VISIT (OUTPATIENT)
Dept: ONCOLOGY | Age: 23
End: 2023-03-08
Payer: COMMERCIAL

## 2023-03-08 VITALS
OXYGEN SATURATION: 100 % | DIASTOLIC BLOOD PRESSURE: 76 MMHG | SYSTOLIC BLOOD PRESSURE: 114 MMHG | BODY MASS INDEX: 22.06 KG/M2 | HEART RATE: 81 BPM | WEIGHT: 124.5 LBS | HEIGHT: 63 IN | TEMPERATURE: 97.7 F

## 2023-03-08 DIAGNOSIS — I63.412 CEREBROVASCULAR ACCIDENT (CVA) DUE TO EMBOLISM OF LEFT MIDDLE CEREBRAL ARTERY (HCC): ICD-10-CM

## 2023-03-08 DIAGNOSIS — Z86.73 H/O ISCHEMIC LEFT MCA STROKE: Primary | ICD-10-CM

## 2023-03-08 DIAGNOSIS — Z86.73 H/O ISCHEMIC LEFT MCA STROKE: ICD-10-CM

## 2023-03-08 DIAGNOSIS — R51.9 CHRONIC DAILY HEADACHE: ICD-10-CM

## 2023-03-08 DIAGNOSIS — Z86.73 HISTORY OF STROKE IN PRIOR 3 MONTHS: Primary | ICD-10-CM

## 2023-03-08 LAB
ALBUMIN SERPL-MCNC: 4.6 G/DL (ref 3.5–5.2)
ALP BLD-CCNC: 62 U/L (ref 35–104)
ALT SERPL-CCNC: 9 U/L (ref 0–32)
ANION GAP SERPL CALCULATED.3IONS-SCNC: 11 MMOL/L (ref 7–16)
AST SERPL-CCNC: 16 U/L (ref 0–31)
BASOPHILS ABSOLUTE: 0.03 E9/L (ref 0–0.2)
BASOPHILS RELATIVE PERCENT: 0.5 % (ref 0–2)
BILIRUB SERPL-MCNC: 0.7 MG/DL (ref 0–1.2)
BUN BLDV-MCNC: 5 MG/DL (ref 6–20)
CALCIUM SERPL-MCNC: 9.6 MG/DL (ref 8.6–10.2)
CHLORIDE BLD-SCNC: 104 MMOL/L (ref 98–107)
CO2: 25 MMOL/L (ref 22–29)
CREAT SERPL-MCNC: 0.4 MG/DL (ref 0.5–1)
EOSINOPHILS ABSOLUTE: 0.13 E9/L (ref 0.05–0.5)
EOSINOPHILS RELATIVE PERCENT: 2.3 % (ref 0–6)
GFR SERPL CREATININE-BSD FRML MDRD: >60 ML/MIN/1.73
GLUCOSE BLD-MCNC: 79 MG/DL (ref 74–99)
HCT VFR BLD CALC: 37.9 % (ref 34–48)
HEMOGLOBIN: 13.1 G/DL (ref 11.5–15.5)
IMMATURE GRANULOCYTES #: 0.02 E9/L
IMMATURE GRANULOCYTES %: 0.3 % (ref 0–5)
LACTATE DEHYDROGENASE: 179 U/L (ref 135–214)
LYMPHOCYTES ABSOLUTE: 1.18 E9/L (ref 1.5–4)
LYMPHOCYTES RELATIVE PERCENT: 20.6 % (ref 20–42)
MCH RBC QN AUTO: 31.6 PG (ref 26–35)
MCHC RBC AUTO-ENTMCNC: 34.6 % (ref 32–34.5)
MCV RBC AUTO: 91.5 FL (ref 80–99.9)
MONOCYTES ABSOLUTE: 0.41 E9/L (ref 0.1–0.95)
MONOCYTES RELATIVE PERCENT: 7.1 % (ref 2–12)
MTHFR BY PCR SPECIMEN: NORMAL
NEUTROPHILS ABSOLUTE: 3.97 E9/L (ref 1.8–7.3)
NEUTROPHILS RELATIVE PERCENT: 69.2 % (ref 43–80)
PDW BLD-RTO: 12.6 FL (ref 11.5–15)
PLASMINOGEN ACT. INHIBITOR-1 (PAI-1) SPECIMEN: NORMAL
PLATELET # BLD: 209 E9/L (ref 130–450)
PMV BLD AUTO: 10.6 FL (ref 7–12)
POTASSIUM SERPL-SCNC: 3.9 MMOL/L (ref 3.5–5)
PT PCR SPECIMEN: NORMAL
RBC # BLD: 4.14 E12/L (ref 3.5–5.5)
SODIUM BLD-SCNC: 140 MMOL/L (ref 132–146)
SPECIMEN: NORMAL
TOTAL PROTEIN: 7.3 G/DL (ref 6.4–8.3)
WBC # BLD: 5.7 E9/L (ref 4.5–11.5)

## 2023-03-08 PROCEDURE — 86147 CARDIOLIPIN ANTIBODY EA IG: CPT

## 2023-03-08 PROCEDURE — 81241 F5 GENE: CPT

## 2023-03-08 PROCEDURE — 80053 COMPREHEN METABOLIC PANEL: CPT

## 2023-03-08 PROCEDURE — 83615 LACTATE (LD) (LDH) ENZYME: CPT

## 2023-03-08 PROCEDURE — 70551 MRI BRAIN STEM W/O DYE: CPT

## 2023-03-08 PROCEDURE — G8420 CALC BMI NORM PARAMETERS: HCPCS | Performed by: STUDENT IN AN ORGANIZED HEALTH CARE EDUCATION/TRAINING PROGRAM

## 2023-03-08 PROCEDURE — 81400 MOPATH PROCEDURE LEVEL 1: CPT

## 2023-03-08 PROCEDURE — G8427 DOCREV CUR MEDS BY ELIG CLIN: HCPCS | Performed by: STUDENT IN AN ORGANIZED HEALTH CARE EDUCATION/TRAINING PROGRAM

## 2023-03-08 PROCEDURE — 99214 OFFICE O/P EST MOD 30 MIN: CPT

## 2023-03-08 PROCEDURE — 85300 ANTITHROMBIN III ACTIVITY: CPT

## 2023-03-08 PROCEDURE — 85306 CLOT INHIBIT PROT S FREE: CPT

## 2023-03-08 PROCEDURE — 1036F TOBACCO NON-USER: CPT | Performed by: STUDENT IN AN ORGANIZED HEALTH CARE EDUCATION/TRAINING PROGRAM

## 2023-03-08 PROCEDURE — 81240 F2 GENE: CPT

## 2023-03-08 PROCEDURE — 99205 OFFICE O/P NEW HI 60 MIN: CPT | Performed by: STUDENT IN AN ORGANIZED HEALTH CARE EDUCATION/TRAINING PROGRAM

## 2023-03-08 PROCEDURE — 81291 MTHFR GENE: CPT

## 2023-03-08 PROCEDURE — 85025 COMPLETE CBC W/AUTO DIFF WBC: CPT

## 2023-03-08 PROCEDURE — 36415 COLL VENOUS BLD VENIPUNCTURE: CPT

## 2023-03-08 PROCEDURE — 85303 CLOT INHIBIT PROT C ACTIVITY: CPT

## 2023-03-08 PROCEDURE — G8484 FLU IMMUNIZE NO ADMIN: HCPCS | Performed by: STUDENT IN AN ORGANIZED HEALTH CARE EDUCATION/TRAINING PROGRAM

## 2023-03-08 PROCEDURE — 85613 RUSSELL VIPER VENOM DILUTED: CPT

## 2023-03-08 PROCEDURE — 86146 BETA-2 GLYCOPROTEIN ANTIBODY: CPT

## 2023-03-08 ASSESSMENT — ENCOUNTER SYMPTOMS
SHORTNESS OF BREATH: 0
GASTROINTESTINAL NEGATIVE: 1

## 2023-03-08 NOTE — PROGRESS NOTES
[address] [address]  Dept: [place]: [phone]  Clinic Consultation Note    Referring Provider:  JL Arias CNP (neurology)    Reason for Visit:   History of suspected embolic CVA of left MCA, here to evaluate for possible underlying hypercoagulable state     PCP:  JL Ramires CNP    Demographics: 26 y.o. female    Chief Complaint:   Chief Complaint   Patient presents with    New Patient     Cva ov       History of Present Illness (3/8/23): The patient is a 26 y.o. female who comes in per neurology referral for evaluation for hypercoagulable work-up in the setting left MCA stroke. Relevant history begins back in August 2022, when she presented with left-sided paresthesia, and right upper extremity weakness at [facility]. At the time she was 33 weeks pregnant. She denies of previous pregnancies or miscarriages before hand. She also denies of smoking or illicit drug use or OCP use. But she does vape. MRI brain reported left coronal radiata infarct and smaller more distal cortical infarcts, concern for embolic source. There is also mention of MRV showing poor quality, unable to determine if there is a left transverse sinus thrombus. Otherwise MRI did not show intracranial arterial disease or carotid arthrosclerosis. Subsequently, the patient was transferred to [facility].  During further work-up, initial transthoracic echocardiogram, suggested possible PFO with right to left shunting. However cardiac MRI and trends esophageal echo, cannot confirm or rule out presence of PFO. Today, patient is doing fairly well. She has recovered from her stroke and denies of new/recurrent symptoms. She denies of any bleed, new headache, vision changes, nausea vomiting, paresthesias, or unilateral weakness or changes in speech.   She denies previous history of arterial events or VTE prior to her hospitalization for stroke. She reports family history of a paternal cousin with 2 strokes from around the age of 27 or 36. She remains to be on dual antiplatelet therapy with statin. Review of Systems; Review of Systems   Constitutional:  Negative for fever and unexpected weight change. HENT: Negative. Eyes:  Negative for visual disturbance. Respiratory:  Negative for shortness of breath. Cardiovascular:  Negative for chest pain and palpitations. Gastrointestinal: Negative. Genitourinary: Negative. Musculoskeletal: Negative. Skin: Negative. Neurological: Negative. Hematological:  Negative for adenopathy. Does not bruise/bleed easily. Psychiatric/Behavioral: Negative. Past Medical History:      Diagnosis Date    34 weeks gestation of pregnancy 2022    38 weeks gestation of pregnancy 2022    Anxiety and depression 3/8/2021    History of stroke in prior 3 months 2022    Noncompliant pregnant patient in third trimester 2022    PCB (post coital bleeding) 2022     (spontaneous vaginal delivery) 2022     Patient Active Problem List   Diagnosis    Anxiety and depression    PCB (post coital bleeding)    History of stroke in prior 3 months    38 weeks gestation of pregnancy     (spontaneous vaginal delivery)    Noncompliant pregnant patient in third trimester        Past Surgical History:      Procedure Laterality Date    ADENOIDECTOMY      TONSILLECTOMY      TYMPANOSTOMY TUBE PLACEMENT         Family History:  Family History   Family history unknown: Yes       Medications:  Reviewed and reconciled.   Current Outpatient Medications   Medication Sig Dispense Refill    topiramate (TOPAMAX) 25 MG tablet Take 2 tablets by mouth nightly Take one tablet once nightly for 7 days then start taking 2 tablets nightly thereafter 60 tablet 2    clopidogrel (PLAVIX) 75 MG tablet Take 1 tablet by mouth daily 30 tablet 2    aspirin 81 MG chewable tablet Take 2 tablets by mouth daily 30 tablet 2    ibuprofen (ADVIL;MOTRIN) 800 MG tablet Take 1 tablet by mouth every 8 hours as needed for Pain 120 tablet 0    Prenatal Vit-Fe Fumarate-FA (PRENATAL VITAMINS) 28-0.8 MG TABS Take 1 tablet by mouth daily (Patient not taking: Reported on 3/8/2023) 30 tablet 5     No current facility-administered medications for this visit. Social History:  Social History     Socioeconomic History    Marital status: Single     Spouse name: Not on file    Number of children: Not on file    Years of education: Not on file    Highest education level: Not on file   Occupational History    Not on file   Tobacco Use    Smoking status: Former     Types: Cigarettes     Quit date: 2022     Years since quittin.1    Smokeless tobacco: Never    Tobacco comments:     vape   Vaping Use    Vaping Use: Some days    Substances: Always   Substance and Sexual Activity    Alcohol use: No    Drug use: No    Sexual activity: Yes     Partners: Male   Other Topics Concern    Not on file   Social History Narrative    Not on file     Social Determinants of Health     Financial Resource Strain: Low Risk     Difficulty of Paying Living Expenses: Not hard at all   Food Insecurity: No Food Insecurity    Worried About Running Out of Food in the Last Year: Never true    Ran Out of Food in the Last Year: Never true   Transportation Needs: Not on file   Physical Activity: Not on file   Stress: Not on file   Social Connections: Not on file   Intimate Partner Violence: Not on file   Housing Stability: Not on file       Allergies: Allergies   Allergen Reactions    Cefzil [Cefprozil]      rash       Physical Exam:  /76   Pulse 81   Temp 97.7 °F (36.5 °C)   Ht 5' 3\" (1.6 m)   Wt 124 lb 8 oz (56.5 kg)   SpO2 100%   BMI 22.05 kg/m²   Physical Exam  Constitutional:       General: She is not in acute distress.      Appearance: She is not ill-appearing or toxic-appearing. HENT:      Head: Normocephalic. Nose: Nose normal.      Mouth/Throat:      Mouth: Mucous membranes are moist.      Pharynx: No oropharyngeal exudate or posterior oropharyngeal erythema. Eyes:      General: No scleral icterus. Extraocular Movements: Extraocular movements intact. Cardiovascular:      Rate and Rhythm: Normal rate. Rhythm irregular. Heart sounds: No murmur heard. Pulmonary:      Effort: Pulmonary effort is normal. No respiratory distress. Breath sounds: No wheezing. Abdominal:      General: Abdomen is flat. There is no distension. Palpations: Abdomen is soft. Musculoskeletal:         General: Normal range of motion. Cervical back: Normal range of motion. No rigidity. Right lower leg: No edema. Left lower leg: No edema. Lymphadenopathy:      Cervical: No cervical adenopathy. Skin:     Coloration: Skin is not jaundiced or pale. Findings: No lesion or rash. Neurological:      General: No focal deficit present. Mental Status: She is alert and oriented to person, place, and time. Cranial Nerves: No cranial nerve deficit. Motor: No weakness. Coordination: Coordination normal.   Psychiatric:         Mood and Affect: Mood normal.         Behavior: Behavior normal.         Thought Content: Thought content normal.       ECOG PS 0    MRI BRAIN WO CONTRAST    Result Date: 3/8/2023  EXAMINATION: MRI OF THE BRAIN WITHOUT CONTRAST  3/8/2023 11:08 am TECHNIQUE: Multiplanar multisequence MRI of the brain was performed without the administration of intravenous contrast. COMPARISON: Head CT dated 11/05/2022 HISTORY: ORDERING SYSTEM PROVIDED HISTORY: Chronic daily headache TECHNOLOGIST PROVIDED HISTORY: Reason for exam:->pt with stroke on [facility] rule out abnormality at young age History of stroke in August 2022 FINDINGS: INTRACRANIAL STRUCTURES/VENTRICLES: There is no acute infarct.   There is a 6 mm well-circumscribed cystic area in the left posterior frontal centrum semiovale seen on T2 weighted series 6, image 18 and coronal series 8, image 14. This corresponds to the area of low attenuation seen on the prior CT study and might be related to an old infarct or injury. Demyelinating disease cannot be excluded although the lesion does not have the typical appearance of demyelinating plaques. No mass effect or midline shift. No evidence of an acute intracranial hemorrhage. The ventricles and sulci are normal in size and configuration. The sellar/suprasellar regions appear unremarkable. The normal signal voids within the major intracranial vessels appear maintained. ORBITS: The visualized portion of the orbits demonstrate no acute abnormality. SINUSES: The visualized paranasal sinuses and mastoid air cells demonstrate no acute abnormality. There is minimal mucosal thickening in the maxillary and ethmoid sinuses. BONES/SOFT TISSUES: The bone marrow signal intensity appears normal. The soft tissues demonstrate no acute abnormality. 1. No evidence of acute intracranial abnormality. 2. 6 mm cystic focus in the left posterior frontal centrum semiovale as noted above. This is nonspecific but could be related to an old infarct. ASSESSMENT:    Left MCV stroke, possibly embolic  Possible PFO  Sinus arrhythmia    The patient is a 26 y.o. female who established me on 3/8/2023 per neurology recommendations for possible hypercoagulable state, in the setting of left MCA stroke. She presented in August 2022 for right-sided paresthesias and right upper extremity weakness at [facility], which MRI was done, revealing left corona radiata infarct and smaller more distal cortical infarcts. MRV was noted to have poor quality, in which it is undetermined if there was a left transverse sinus thrombus. There is no other evidence of intracranial arterial or carotid disease.     She was transferred to [facility] in [place] for further evaluation. She was seen by neurology, which findings were concerning for embolic etiology for stroke. Transthoracic echocardiogram suggest possible PFO with right to left shunting. However transesophageal echo showed no evidence of ASD or VSD, but states PFO cannot be ruled out. Cardiac MRI was also done which again showed no evidence of large ASD or VSD, but unable to rule out small PFO. There is also mention of heavy trabeculations in both ventricles, meeting criteria for noncompaction. Patient denies of any lower extremity swelling around the time of her stroke. Family history includes paternal cousin, who suffered from 2 strokes around the age of 27 or 36. Patient denies of any miscarriages or previous VTE or arterial events prior to her stroke. She was able to give birth after her stroke, and states baby is doing well without major issues. PLAN:  Neurology team ordered a thrombophilia panel, but it does not appear to be collected  We will initiate hypercoagulable work-up, including APS panel, factor V Leiden, prothrombin gene mutation, protein C and protein S.  I also ordered Antithrombin III, but after my orders were placed, appears to have been discontinued and switched to a thrombophilia panel. I reviewed available documentation from [facility] in media. Patient states that she had seen cardiology at did a heart monitor. I cannot appreciate finding this documentation on Epic. But should otherwise follow up with cardiology. Neurology has also sent referral for local cardiology. If the PFO is indeed found, could consider closure of such cardiac defects. Consider pregnancy was associated with her stroke. I am unsure if patient ever did lower extremity ultrasounds  Otherwise she remains to be on dual antiplatelet therapy and statin. At this time, we will hold off on incorporating full dose anticoagulation. Patient had repeat brain MRI earlier today.     [facility]ys [facility]:   RTC 3 weeks      Thank you for allowing us to participate in the care of Adair Fowler       Approximately spent 62 minutes on chart review as well as time spent on patient encounter, discussing the laboratory, imaging, and clinical findings; and documentation. I have discussed clinical implications and recommendations on the patient's primary issues. More than 50% of time was spent counseling patient. The patient verbalized understanding.       [address] 03/08/23 1:43 PM    [address] [address]) Office  P: [phone]  F: [phone]) Office  P: [phone]  F: [phone]

## 2023-03-08 NOTE — PROGRESS NOTES
Celio Robins  2000 23 y.o. Referring Physician:     PCP: JL Jiménez CNP    Vitals:    23 1312   BP: 114/76   Pulse: 81   Temp: 97.7 °F (36.5 °C)   SpO2: 100%        Wt Readings from Last 3 Encounters:   23 124 lb 8 oz (56.5 kg)   23 115 lb (52.2 kg)   11/10/22 116 lb 6.4 oz (52.8 kg)        Body mass index is 22.05 kg/m². Chief Complaint:   Chief Complaint   Patient presents with    New Patient     Cva ov         Cancer Staging  No matching staging information was found for the patient. Prior Radiation Therapy? NO    Concurrent Chemo/radiation? NO    Prior Chemotherapy? NO    Prior Hormonal Therapy? NO    Head and Neck Cancer? No, patient does NOT have HN cancer.       LMP: 2/10/22    Age at first Menses: 17    : 1    Para: 1          Current Outpatient Medications:     topiramate (TOPAMAX) 25 MG tablet, Take 2 tablets by mouth nightly Take one tablet once nightly for 7 days then start taking 2 tablets nightly thereafter, Disp: 60 tablet, Rfl: 2    clopidogrel (PLAVIX) 75 MG tablet, Take 1 tablet by mouth daily, Disp: 30 tablet, Rfl: 2    aspirin 81 MG chewable tablet, Take 2 tablets by mouth daily, Disp: 30 tablet, Rfl: 2    ibuprofen (ADVIL;MOTRIN) 800 MG tablet, Take 1 tablet by mouth every 8 hours as needed for Pain, Disp: 120 tablet, Rfl: 0    Prenatal Vit-Fe Fumarate-FA (PRENATAL VITAMINS) 28-0.8 MG TABS, Take 1 tablet by mouth daily (Patient not taking: Reported on 3/8/2023), Disp: 30 tablet, Rfl: 5       Past Medical History:   Diagnosis Date    34 weeks gestation of pregnancy 2022    38 weeks gestation of pregnancy 2022    Anxiety and depression 3/8/2021    History of stroke in prior 3 months 2022    Noncompliant pregnant patient in third trimester 2022    PCB (post coital bleeding) 2022     (spontaneous vaginal delivery) 2022       Past Surgical History:   Procedure Laterality Date    ADENOIDECTOMY      TONSILLECTOMY TYMPANOSTOMY TUBE PLACEMENT         Family History   Family history unknown: Yes       Social History     Socioeconomic History    Marital status: Single     Spouse name: Not on file    Number of children: Not on file    Years of education: Not on file    Highest education level: Not on file   Occupational History    Not on file   Tobacco Use    Smoking status: Former     Types: Cigarettes     Quit date: 2022     Years since quittin.1    Smokeless tobacco: Never    Tobacco comments:     vape   Vaping Use    Vaping Use: Some days    Substances: Always   Substance and Sexual Activity    Alcohol use: No    Drug use: No    Sexual activity: Yes   Other Topics Concern    Not on file   Social History Narrative    Not on file     Social Determinants of Health     Financial Resource Strain: Low Risk     Difficulty of Paying Living Expenses: Not hard at all   Food Insecurity: No Food Insecurity    Worried About Running Out of Food in the Last Year: Never true    Ran Out of Food in the Last Year: Never true   Transportation Needs: Not on file   Physical Activity: Not on file   Stress: Not on file   Social Connections: Not on file   Intimate Partner Violence: Not on file   Housing Stability: Not on file           Occupation: homemaker  Retired:  NO          REVIEW OF SYSTEMS:     Pacemaker/Defibulator/ICD:  No    Mediport: No           FALLS RISK SCREENING ASSESSMENT    Instructions:  Assess the patient and [employer] [place] the appropriate indicators that are present for fall risk identification. Total the numbers circled and assign a fall risk score from Table 2.  Reassess patient at a minimum every 12 weeks or with status change. Assessment   Date  3/8/2023     1. Mental Ability: confusion/cognitively impaired No - 0       2. Elimination Issues: incontinence, frequency No - 0       3. Ambulatory: use of assistive devices (walker, cane, off-loading devices), attached to equipment (IV pole, oxygen) No - 0     4. Sensory Limitations: dizziness, vertigo, impaired vision No - 0       5. Age Less than 65 years - 0       6. Medication: diuretics, strong analgesics, hypnotics, sedatives, antihypertensive agents   Yes - 3   7. Falls:  recent history of falls within the last 3 months (not to include slipping or tripping)   No - 0   TOTAL 3    If score of 4 or greater was education given? No       TABLE 2   Risk Score Risk Level Plan of Care   0-3 Little or  No Risk 1. Provide assistance as indicated for ambulation activities  2. Reorient confused/cognitively impaired patient  3. Call-light/bell within patient's reach  4. Chair/bed in low position, stretcher/bed with siderails up except when performing patient care activities  5. Educate patient/family/caregiver on falls prevention  6.  Reassess in 12 weeks or with any noted change in patient condition which places them at a risk for a fall   4-6 Moderate Risk 1. Provide assistance as indicated for ambulation activities  2. Reorient confused/cognitively impaired patient  3. Call-light/bell within patient's reach  4. Chair/bed in low position, stretcher/bed with siderails up except when performing patient care activities  5. Educate patient/family/caregiver on falls prevention  6. Falls risk precaution (Yellow sticker Level II) placed on patient chart   7 or   Higher High Risk 1. Place patient in easily observable treatment room  2. Patient attended at all times by family member or staff  3. Provide assistance as indicated for ambulation activities  4. Reorient confused/cognitively impaired patient  5. Call-light/bell within patient's reach  6. Chair/bed in low position, stretcher/bed with siderails up except when performing patient care activities  7. Educate patient/family/caregiver on falls prevention  8.   Falls risk precaution (Yellow sticker Level III) placed on patient chart           MALNUTRITION RISK SCREENING ASSESSMENT    Instructions:  Assess the patient and enter the appropriate indicators that are present for nutrition risk identification. Total the numbers entered and assign a risk score. Follow the appropriate action for total score listed below. Assessment   Date  3/8/2023     Have you lost weight without trying? 0- No     Have you been eating poorly because of a decreased appetite? 0- No   3. Do you have a diagnosis of head and neck cancer? 0- No                                                                                    TOTAL 0        Score of 0-1: No action  Score 2 or greater:   For Non-Diabetic Patient: Recommend adding Ensure Enlive 2 x daily and provide patient with Ensure wellness bag with coupons  For Diabetic Patient: Recommend adding Glucerna Shake 2 x daily and provide patient with Glucerna Wellness bag with coupons  Route to the dietitian via Shaq Negro RN

## 2023-03-09 LAB
AT-III ACTIVITY: 119 % ACTIVITY (ref 83–121)
LUPUS ANTICOAG DVVT: NORMAL
PROTEIN C ACTIVITY: 114 % ACTIVITY (ref 68–165)
SPECIMEN: NORMAL

## 2023-03-09 NOTE — RESULT ENCOUNTER NOTE
Please let patient know that her MRI of her brain did not show any new findings only her old stroke was seen.

## 2023-03-11 LAB
ANTICARDIOLIPIN IGA ANTIBODY: <10 APL
ANTICARDIOLIPIN IGG ANTIBODY: <10 GPL
BETA-2 GLYCOPROTEIN 1 IGG ANTIBODY: <10 SGU
BETA-2 GLYCOPROTEIN 1 IGM ANTIBODY: <10 SMU
CARDIOLIPIN AB IGM: <10 MPL

## 2023-03-12 LAB — PROTEIN S ANTIGEN, FREE: 116 % (ref 55–123)

## 2023-03-13 ENCOUNTER — TELEPHONE (OUTPATIENT)
Dept: NEUROLOGY | Age: 23
End: 2023-03-13

## 2023-03-13 NOTE — TELEPHONE ENCOUNTER
----- Message from Joey Gutierrez, [address] - CNP sent at 3/9/2023  4:24 PM EST -----  Please let patient know that her MRI of her brain did not show any new findings only her old stroke was seen.

## 2023-03-13 NOTE — TELEPHONE ENCOUNTER
MA left message for patient with Miladis's response.   Electronically signed by Tamera Crane MA on 3/13/2023 at 8:58 AM

## 2023-03-14 LAB
FACTOR V LEIDEN: NEGATIVE
SPECIMEN: NORMAL

## 2023-03-17 LAB
F2 C.20210G>A GENO BLD/T: NEGATIVE
MTHFR C.1298A>C GENO BLD/T: NEGATIVE
MTHFR C.677C>T GENO BLD/T: NORMAL
MTHFR GENE MUT ANL BLD/T: NORMAL
SERPINE1 GENE MUT ANL BLD/T: ABNORMAL
SPECIMEN SOURCE: ABNORMAL
SPECIMEN SOURCE: NORMAL
SPECIMEN SOURCE: NORMAL

## 2023-03-18 LAB
Lab: NORMAL
THIS TEST SENT TO: NORMAL

## 2023-03-20 LAB
FACTOR XIII (F13A1) V34L VARIANT: NEGATIVE
FACTOR XIII VARIANT SPECIMEN: NORMAL

## 2023-03-23 LAB
Lab: NORMAL
REPORT: NORMAL
THIS TEST SENT TO: NORMAL

## 2023-04-11 DIAGNOSIS — I63.412 CEREBROVASCULAR ACCIDENT (CVA) DUE TO EMBOLISM OF LEFT MIDDLE CEREBRAL ARTERY (HCC): ICD-10-CM

## 2023-05-04 ENCOUNTER — HOSPITAL ENCOUNTER (EMERGENCY)
Age: 23
Discharge: HOME | End: 2023-05-04
Attending: FAMILY MEDICINE
Payer: COMMERCIAL

## 2023-05-04 VITALS
HEIGHT: 63 IN | OXYGEN SATURATION: 100 % | RESPIRATION RATE: 16 BRPM | TEMPERATURE: 97.5 F | SYSTOLIC BLOOD PRESSURE: 103 MMHG | WEIGHT: 125 LBS | BODY MASS INDEX: 22.15 KG/M2 | HEART RATE: 85 BPM | DIASTOLIC BLOOD PRESSURE: 66 MMHG

## 2023-05-04 DIAGNOSIS — B35.4 TINEA CORPORIS: Primary | ICD-10-CM

## 2023-05-04 PROCEDURE — 99283 EMERGENCY DEPT VISIT LOW MDM: CPT

## 2023-05-04 RX ORDER — CLOTRIMAZOLE AND BETAMETHASONE DIPROPIONATE 10; .64 MG/G; MG/G
CREAM TOPICAL
Qty: 15 G | Refills: 0 | Status: SHIPPED | OUTPATIENT
Start: 2023-05-04

## 2023-05-04 ASSESSMENT — PAIN - FUNCTIONAL ASSESSMENT: PAIN_FUNCTIONAL_ASSESSMENT: NONE - DENIES PAIN

## 2023-05-04 NOTE — ED PROVIDER NOTES
HPI:  23,   Time: 1:07 PM EDT         Virginia Monge is a 26 y.o. female presenting to the ED for a 2-day history of a rash on the left upper arm and she complains that it is itching. He denies contact with farm animals or pets. She denies contact with anyone who has similar symptoms. ROS:   Pertinent positives and negatives are stated within HPI, all other systems reviewed and are negative.  --------------------------------------------- PAST HISTORY ---------------------------------------------  Past Medical History:  has a past medical history of 34 weeks gestation of pregnancy, 38 weeks gestation of pregnancy, Anxiety and depression, History of stroke in prior 3 months, Noncompliant pregnant patient in third trimester, PCB (post coital bleeding), and  (spontaneous vaginal delivery). Past Surgical History:  has a past surgical history that includes Tonsillectomy; Adenoidectomy; and Tympanostomy tube placement. Social History:  reports that she quit smoking about 15 months ago. Her smoking use included cigarettes. She has never used smokeless tobacco. She reports that she does not drink alcohol and does not use drugs. Family History: Family history is unknown by patient. The patients home medications have been reviewed. Allergies: Cefzil [cefprozil]    -------------------------------------------------- RESULTS -------------------------------------------------  All laboratory and radiology results have been personally reviewed by myself   LABS:  No results found for this visit on 23. RADIOLOGY:  Interpreted by Radiologist.  No orders to display       ------------------------- NURSING NOTES AND VITALS REVIEWED ---------------------------   The nursing notes within the ED encounter and vital signs as below have been reviewed.    /66   Pulse 85   Temp 97.5 °F (36.4 °C) (Temporal)   Resp 16   Ht 5' 3\" (1.6 m)   Wt 125 lb (56.7 kg)   LMP 2023   SpO2 100%   BMI

## 2023-06-05 ENCOUNTER — HOSPITAL ENCOUNTER (EMERGENCY)
Age: 23
Discharge: HOME | End: 2023-06-05
Attending: FAMILY MEDICINE
Payer: COMMERCIAL

## 2023-06-05 VITALS
BODY MASS INDEX: 22.5 KG/M2 | RESPIRATION RATE: 16 BRPM | OXYGEN SATURATION: 98 % | HEART RATE: 105 BPM | DIASTOLIC BLOOD PRESSURE: 76 MMHG | HEIGHT: 63 IN | SYSTOLIC BLOOD PRESSURE: 114 MMHG | TEMPERATURE: 97.5 F | WEIGHT: 127 LBS

## 2023-06-05 DIAGNOSIS — B08.4 HAND, FOOT AND MOUTH DISEASE: Primary | ICD-10-CM

## 2023-06-05 DIAGNOSIS — B35.4 TINEA CORPORIS: ICD-10-CM

## 2023-06-05 PROCEDURE — 99283 EMERGENCY DEPT VISIT LOW MDM: CPT

## 2023-06-05 RX ORDER — PRENATAL VIT 91/IRON/FOLIC/DHA 28-975-200
COMBINATION PACKAGE (EA) ORAL
Qty: 30 G | Refills: 1 | Status: SHIPPED | OUTPATIENT
Start: 2023-06-05

## 2023-06-05 ASSESSMENT — PAIN - FUNCTIONAL ASSESSMENT: PAIN_FUNCTIONAL_ASSESSMENT: NONE - DENIES PAIN

## 2023-06-05 NOTE — ED PROVIDER NOTES
HPI:  23,   Time: 6:10 PM EDT         Farbice Godfrey is a 26 y.o. female presenting to the ED for 2-day history of noticing spots on the palms of her hand and on the bottoms of her feet. She denies nasal congestion or nasal discharge. The ones on her hands seem to her little bit. She denies any mouth pain. She is around young children but not school-age. She denies any sick contacts. Also, she continues to have an rash on the left arm that originally started treatment several months ago for tinea corporis, she did run out of the Lamisil treatment and then started using Lamisil belonged to a friend. He did state that it is getting better. ROS:   Pertinent positives and negatives are stated within HPI, all other systems reviewed and are negative.  --------------------------------------------- PAST HISTORY ---------------------------------------------  Past Medical History:  has a past medical history of 34 weeks gestation of pregnancy, 38 weeks gestation of pregnancy, Anxiety and depression, History of stroke in prior 3 months, Noncompliant pregnant patient in third trimester, PCB (post coital bleeding), and  (spontaneous vaginal delivery). Past Surgical History:  has a past surgical history that includes Tonsillectomy; Adenoidectomy; and Tympanostomy tube placement. Social History:  reports that she quit smoking about 16 months ago. Her smoking use included cigarettes. She has never used smokeless tobacco. She reports that she does not drink alcohol and does not use drugs. Family History: Family history is unknown by patient. The patients home medications have been reviewed. Allergies: Cefzil [cefprozil]    -------------------------------------------------- RESULTS -------------------------------------------------  All laboratory and radiology results have been personally reviewed by myself   LABS:  No results found for this visit on 23.     RADIOLOGY:  Interpreted by

## 2024-12-02 ENCOUNTER — OFFICE VISIT (OUTPATIENT)
Dept: FAMILY MEDICINE CLINIC | Age: 24
End: 2024-12-02
Payer: COMMERCIAL

## 2024-12-02 VITALS
HEART RATE: 83 BPM | WEIGHT: 146 LBS | TEMPERATURE: 97.5 F | RESPIRATION RATE: 16 BRPM | BODY MASS INDEX: 25.87 KG/M2 | SYSTOLIC BLOOD PRESSURE: 118 MMHG | OXYGEN SATURATION: 98 % | HEIGHT: 63 IN | DIASTOLIC BLOOD PRESSURE: 68 MMHG

## 2024-12-02 DIAGNOSIS — H60.501 ACUTE OTITIS EXTERNA OF RIGHT EAR, UNSPECIFIED TYPE: Primary | ICD-10-CM

## 2024-12-02 DIAGNOSIS — H92.01 OTALGIA OF RIGHT EAR: ICD-10-CM

## 2024-12-02 PROCEDURE — 99213 OFFICE O/P EST LOW 20 MIN: CPT | Performed by: NURSE PRACTITIONER

## 2024-12-02 PROCEDURE — G8484 FLU IMMUNIZE NO ADMIN: HCPCS | Performed by: NURSE PRACTITIONER

## 2024-12-02 PROCEDURE — G8427 DOCREV CUR MEDS BY ELIG CLIN: HCPCS | Performed by: NURSE PRACTITIONER

## 2024-12-02 PROCEDURE — G8419 CALC BMI OUT NRM PARAM NOF/U: HCPCS | Performed by: NURSE PRACTITIONER

## 2024-12-02 PROCEDURE — 4130F TOPICAL PREP RX AOE: CPT | Performed by: NURSE PRACTITIONER

## 2024-12-02 PROCEDURE — 1036F TOBACCO NON-USER: CPT | Performed by: NURSE PRACTITIONER

## 2024-12-02 RX ORDER — NEOMYCIN SULFATE, POLYMYXIN B SULFATE, HYDROCORTISONE 3.5; 10000; 1 MG/ML; [USP'U]/ML; MG/ML
4 SOLUTION/ DROPS AURICULAR (OTIC) 3 TIMES DAILY
Qty: 10 ML | Refills: 0 | Status: SHIPPED | OUTPATIENT
Start: 2024-12-02 | End: 2024-12-12

## 2024-12-02 RX ORDER — CETIRIZINE HYDROCHLORIDE 10 MG/1
10 TABLET ORAL DAILY
Qty: 30 TABLET | Refills: 3 | Status: SHIPPED | OUTPATIENT
Start: 2024-12-02

## 2024-12-02 SDOH — ECONOMIC STABILITY: FOOD INSECURITY: WITHIN THE PAST 12 MONTHS, THE FOOD YOU BOUGHT JUST DIDN'T LAST AND YOU DIDN'T HAVE MONEY TO GET MORE.: NEVER TRUE

## 2024-12-02 SDOH — ECONOMIC STABILITY: FOOD INSECURITY: WITHIN THE PAST 12 MONTHS, YOU WORRIED THAT YOUR FOOD WOULD RUN OUT BEFORE YOU GOT MONEY TO BUY MORE.: NEVER TRUE

## 2024-12-02 SDOH — ECONOMIC STABILITY: INCOME INSECURITY: HOW HARD IS IT FOR YOU TO PAY FOR THE VERY BASICS LIKE FOOD, HOUSING, MEDICAL CARE, AND HEATING?: NOT HARD AT ALL

## 2024-12-02 ASSESSMENT — ENCOUNTER SYMPTOMS
VOMITING: 0
WHEEZING: 0
SORE THROAT: 0
COUGH: 0
CONSTIPATION: 0
NAUSEA: 0
SHORTNESS OF BREATH: 0
DIARRHEA: 0

## 2024-12-02 ASSESSMENT — PATIENT HEALTH QUESTIONNAIRE - PHQ9
3. TROUBLE FALLING OR STAYING ASLEEP: NOT AT ALL
SUM OF ALL RESPONSES TO PHQ QUESTIONS 1-9: 0
1. LITTLE INTEREST OR PLEASURE IN DOING THINGS: NOT AT ALL
4. FEELING TIRED OR HAVING LITTLE ENERGY: NOT AT ALL
7. TROUBLE CONCENTRATING ON THINGS, SUCH AS READING THE NEWSPAPER OR WATCHING TELEVISION: NOT AT ALL
SUM OF ALL RESPONSES TO PHQ QUESTIONS 1-9: 0
9. THOUGHTS THAT YOU WOULD BE BETTER OFF DEAD, OR OF HURTING YOURSELF: NOT AT ALL
2. FEELING DOWN, DEPRESSED OR HOPELESS: NOT AT ALL
5. POOR APPETITE OR OVEREATING: NOT AT ALL
8. MOVING OR SPEAKING SO SLOWLY THAT OTHER PEOPLE COULD HAVE NOTICED. OR THE OPPOSITE, BEING SO FIGETY OR RESTLESS THAT YOU HAVE BEEN MOVING AROUND A LOT MORE THAN USUAL: NOT AT ALL
SUM OF ALL RESPONSES TO PHQ9 QUESTIONS 1 & 2: 0
10. IF YOU CHECKED OFF ANY PROBLEMS, HOW DIFFICULT HAVE THESE PROBLEMS MADE IT FOR YOU TO DO YOUR WORK, TAKE CARE OF THINGS AT HOME, OR GET ALONG WITH OTHER PEOPLE: NOT DIFFICULT AT ALL
SUM OF ALL RESPONSES TO PHQ QUESTIONS 1-9: 0
SUM OF ALL RESPONSES TO PHQ QUESTIONS 1-9: 0
6. FEELING BAD ABOUT YOURSELF - OR THAT YOU ARE A FAILURE OR HAVE LET YOURSELF OR YOUR FAMILY DOWN: NOT AT ALL

## 2024-12-02 NOTE — PROGRESS NOTES
Gill Buckner (:  2000) is a 24 y.o. female,Established patient, here for evaluation of the following chief complaint(s):  Ear Pain (Right ear pain been going on for months a little dizziness )      Assessment & Plan   ASSESSMENT/PLAN:  1. Acute otitis externa of right ear, unspecified type  -     neomycin-polymyxin-hydrocortisone (CORTISPORIN) 3.5-25605-2 otic solution; Place 4 drops into the right ear 3 times daily for 10 days Instill into right Ear, Disp-10 mL, R-0Normal  2. Otalgia of right ear  -     cetirizine (ZYRTEC ALLERGY) 10 MG tablet; Take 1 tablet by mouth daily, Disp-30 tablet, R-3Normal  -     neomycin-polymyxin-hydrocortisone (CORTISPORIN) 3.5-22696-9 otic solution; Place 4 drops into the right ear 3 times daily for 10 days Instill into right Ear, Disp-10 mL, R-0Normal  Contact office if symptoms do not improve as expected or worsen.      No follow-ups on file.         Subjective   SUBJECTIVE/OBJECTIVE:  Complains of earache right ear for past 2 months.  Used OTC ear drops yesterday and is worse today.  Also having intermittent dizziness        Review of Systems   Constitutional:  Negative for chills, diaphoresis and fever.   HENT:  Positive for ear pain (right). Negative for congestion and sore throat.    Respiratory:  Negative for cough, shortness of breath and wheezing.    Cardiovascular:  Negative for chest pain and palpitations.   Gastrointestinal:  Negative for constipation, diarrhea, nausea and vomiting.   Musculoskeletal:  Negative for myalgias.   Skin:  Negative for rash.   Neurological:  Positive for dizziness and headaches. Negative for weakness.   Psychiatric/Behavioral:  Negative for dysphoric mood and sleep disturbance. The patient is not nervous/anxious.           Objective   /68   Pulse 83   Temp 97.5 °F (36.4 °C)   Resp 16   Ht 1.6 m (5' 2.99\")   Wt 66.2 kg (146 lb)   LMP 2024 (Approximate)   SpO2 98%   BMI 25.87 kg/m²    Physical Exam  Constitutional: